# Patient Record
Sex: MALE | Race: WHITE | NOT HISPANIC OR LATINO
[De-identification: names, ages, dates, MRNs, and addresses within clinical notes are randomized per-mention and may not be internally consistent; named-entity substitution may affect disease eponyms.]

---

## 2021-08-31 ENCOUNTER — NON-APPOINTMENT (OUTPATIENT)
Age: 71
End: 2021-08-31

## 2021-10-12 ENCOUNTER — APPOINTMENT (OUTPATIENT)
Dept: FAMILY MEDICINE | Facility: CLINIC | Age: 71
End: 2021-10-12
Payer: COMMERCIAL

## 2021-10-12 VITALS
SYSTOLIC BLOOD PRESSURE: 118 MMHG | HEIGHT: 69 IN | WEIGHT: 153 LBS | TEMPERATURE: 97.7 F | OXYGEN SATURATION: 98 % | DIASTOLIC BLOOD PRESSURE: 76 MMHG | RESPIRATION RATE: 16 BRPM | BODY MASS INDEX: 22.66 KG/M2 | HEART RATE: 76 BPM

## 2021-10-12 DIAGNOSIS — Z23 ENCOUNTER FOR IMMUNIZATION: ICD-10-CM

## 2021-10-12 PROCEDURE — 90715 TDAP VACCINE 7 YRS/> IM: CPT

## 2021-10-12 PROCEDURE — 90471 IMMUNIZATION ADMIN: CPT

## 2021-10-12 PROCEDURE — 99387 INIT PM E/M NEW PAT 65+ YRS: CPT | Mod: 25

## 2021-10-12 PROCEDURE — 36415 COLL VENOUS BLD VENIPUNCTURE: CPT

## 2021-10-13 LAB
25(OH)D3 SERPL-MCNC: 33 NG/ML
ALBUMIN SERPL ELPH-MCNC: 4.6 G/DL
ALP BLD-CCNC: 91 U/L
ALT SERPL-CCNC: 24 U/L
ANION GAP SERPL CALC-SCNC: 13 MMOL/L
APPEARANCE: CLEAR
AST SERPL-CCNC: 21 U/L
BACTERIA: NEGATIVE
BASOPHILS # BLD AUTO: 0.07 K/UL
BASOPHILS NFR BLD AUTO: 1.4 %
BILIRUB SERPL-MCNC: 0.6 MG/DL
BILIRUBIN URINE: NEGATIVE
BLOOD URINE: NORMAL
BUN SERPL-MCNC: 21 MG/DL
CALCIUM SERPL-MCNC: 9.5 MG/DL
CHLORIDE SERPL-SCNC: 103 MMOL/L
CHOLEST SERPL-MCNC: 248 MG/DL
CO2 SERPL-SCNC: 25 MMOL/L
COLOR: YELLOW
CREAT SERPL-MCNC: 0.9 MG/DL
EOSINOPHIL # BLD AUTO: 0.04 K/UL
EOSINOPHIL NFR BLD AUTO: 0.8 %
ESTIMATED AVERAGE GLUCOSE: 103 MG/DL
GLUCOSE QUALITATIVE U: NEGATIVE
GLUCOSE SERPL-MCNC: 102 MG/DL
HBA1C MFR BLD HPLC: 5.2 %
HCT VFR BLD CALC: 50.8 %
HDLC SERPL-MCNC: 87 MG/DL
HGB BLD-MCNC: 16.5 G/DL
HYALINE CASTS: 0 /LPF
IMM GRANULOCYTES NFR BLD AUTO: 0.2 %
KETONES URINE: NEGATIVE
LDLC SERPL CALC-MCNC: 138 MG/DL
LEUKOCYTE ESTERASE URINE: NEGATIVE
LYMPHOCYTES # BLD AUTO: 1.22 K/UL
LYMPHOCYTES NFR BLD AUTO: 24.5 %
MAN DIFF?: NORMAL
MCHC RBC-ENTMCNC: 32.5 GM/DL
MCHC RBC-ENTMCNC: 32.7 PG
MCV RBC AUTO: 100.6 FL
MICROSCOPIC-UA: NORMAL
MONOCYTES # BLD AUTO: 0.54 K/UL
MONOCYTES NFR BLD AUTO: 10.8 %
NEUTROPHILS # BLD AUTO: 3.1 K/UL
NEUTROPHILS NFR BLD AUTO: 62.3 %
NITRITE URINE: NEGATIVE
NONHDLC SERPL-MCNC: 161 MG/DL
PH URINE: 5.5
PLATELET # BLD AUTO: 252 K/UL
POTASSIUM SERPL-SCNC: 4.4 MMOL/L
PROT SERPL-MCNC: 7.2 G/DL
PROTEIN URINE: NORMAL
PSA SERPL-MCNC: 12.4 NG/ML
RBC # BLD: 5.05 M/UL
RBC # FLD: 12.9 %
RED BLOOD CELLS URINE: 2 /HPF
SODIUM SERPL-SCNC: 141 MMOL/L
SPECIFIC GRAVITY URINE: 1.02
SQUAMOUS EPITHELIAL CELLS: 0 /HPF
TESTOST SERPL-MCNC: 747 NG/DL
TRIGL SERPL-MCNC: 113 MG/DL
TSH SERPL-ACNC: 3.39 UIU/ML
UROBILINOGEN URINE: NORMAL
WBC # FLD AUTO: 4.98 K/UL
WHITE BLOOD CELLS URINE: 0 /HPF

## 2021-12-16 ENCOUNTER — APPOINTMENT (OUTPATIENT)
Dept: FAMILY MEDICINE | Facility: CLINIC | Age: 71
End: 2021-12-16
Payer: COMMERCIAL

## 2021-12-16 ENCOUNTER — NON-APPOINTMENT (OUTPATIENT)
Age: 71
End: 2021-12-16

## 2021-12-16 VITALS
DIASTOLIC BLOOD PRESSURE: 82 MMHG | SYSTOLIC BLOOD PRESSURE: 120 MMHG | HEART RATE: 77 BPM | HEIGHT: 69 IN | TEMPERATURE: 97.5 F | OXYGEN SATURATION: 98 % | WEIGHT: 153 LBS | BODY MASS INDEX: 22.66 KG/M2

## 2021-12-16 PROCEDURE — 99214 OFFICE O/P EST MOD 30 MIN: CPT | Mod: 25

## 2021-12-16 PROCEDURE — 93000 ELECTROCARDIOGRAM COMPLETE: CPT | Mod: 59

## 2021-12-17 LAB
BASOPHILS # BLD AUTO: 0.05 K/UL
BASOPHILS NFR BLD AUTO: 0.9 %
EOSINOPHIL # BLD AUTO: 0.04 K/UL
EOSINOPHIL NFR BLD AUTO: 0.7 %
HCT VFR BLD CALC: 48.2 %
HGB BLD-MCNC: 15.6 G/DL
IMM GRANULOCYTES NFR BLD AUTO: 0.2 %
LYMPHOCYTES # BLD AUTO: 1.25 K/UL
LYMPHOCYTES NFR BLD AUTO: 22.1 %
MAN DIFF?: NORMAL
MCHC RBC-ENTMCNC: 32 PG
MCHC RBC-ENTMCNC: 32.4 GM/DL
MCV RBC AUTO: 98.8 FL
MONOCYTES # BLD AUTO: 0.65 K/UL
MONOCYTES NFR BLD AUTO: 11.5 %
NEUTROPHILS # BLD AUTO: 3.66 K/UL
NEUTROPHILS NFR BLD AUTO: 64.6 %
PLATELET # BLD AUTO: 277 K/UL
RBC # BLD: 4.88 M/UL
RBC # FLD: 12.9 %
WBC # FLD AUTO: 5.66 K/UL

## 2021-12-18 LAB
ALBUMIN SERPL ELPH-MCNC: 4.4 G/DL
ALP BLD-CCNC: 87 U/L
ALT SERPL-CCNC: 26 U/L
ANION GAP SERPL CALC-SCNC: 13 MMOL/L
AST SERPL-CCNC: 26 U/L
BILIRUB SERPL-MCNC: 0.7 MG/DL
BUN SERPL-MCNC: 23 MG/DL
CALCIUM SERPL-MCNC: 9.3 MG/DL
CHLORIDE SERPL-SCNC: 103 MMOL/L
CO2 SERPL-SCNC: 23 MMOL/L
CREAT SERPL-MCNC: 1.01 MG/DL
GLUCOSE SERPL-MCNC: 82 MG/DL
POTASSIUM SERPL-SCNC: 4.2 MMOL/L
PROT SERPL-MCNC: 6.9 G/DL
SODIUM SERPL-SCNC: 139 MMOL/L

## 2021-12-19 LAB — SARS-COV-2 N GENE NPH QL NAA+PROBE: NOT DETECTED

## 2022-01-19 ENCOUNTER — APPOINTMENT (OUTPATIENT)
Dept: UROLOGY | Facility: CLINIC | Age: 72
End: 2022-01-19
Payer: COMMERCIAL

## 2022-01-19 VITALS
SYSTOLIC BLOOD PRESSURE: 138 MMHG | HEART RATE: 82 BPM | DIASTOLIC BLOOD PRESSURE: 78 MMHG | TEMPERATURE: 98 F | RESPIRATION RATE: 16 BRPM | OXYGEN SATURATION: 99 %

## 2022-01-19 DIAGNOSIS — Z78.9 OTHER SPECIFIED HEALTH STATUS: ICD-10-CM

## 2022-01-19 PROCEDURE — 99204 OFFICE O/P NEW MOD 45 MIN: CPT

## 2022-01-21 NOTE — HISTORY OF PRESENT ILLNESS
[FreeTextEntry1] : Bong Beatty presents to the office today.  He is a 71-year-old man here today for second opinion about management of recently diagnosed prostate cancer.  His PSA was noted to be 12.58 ng/mL prior to his biopsy.  He had an MRI done shortly after his biopsy showing a prostate volume of about 99 cm³ and a PI-RADS category 4 lesion.  The patient had a 12 core needle biopsy performed showing 9 out of 12 biopsy samples positive for malignancy with Jin 3+4 pattern noted to be the most significant disease present.\par \par The patient has no known family history of prostate cancer.  He does have some mild lower urinary tract symptoms but is not currently on any medication for his prostate or bladder.

## 2022-01-21 NOTE — DISEASE MANAGEMENT
[1] : T1 [c] : c [10-20] : 10 - 20 ng/mL [Biopsy] : Patient had a biopsy on [7(3+4)] : Template Biopsy [place] Score: 7(3+4) [] : Patient had a Prostate MRI [4] : 4 [IIB] : IIB [X] : MX [MeasuredProstateVolume] : 99 [TotalCores] : 12 [TotalPositiveCores] : 9 [MaxCoreInvolvement] : 30

## 2022-02-08 ENCOUNTER — RESULT REVIEW (OUTPATIENT)
Age: 72
End: 2022-02-08

## 2022-03-11 ENCOUNTER — OUTPATIENT (OUTPATIENT)
Dept: OUTPATIENT SERVICES | Facility: HOSPITAL | Age: 72
LOS: 1 days | End: 2022-03-11
Payer: COMMERCIAL

## 2022-03-11 VITALS
OXYGEN SATURATION: 98 % | DIASTOLIC BLOOD PRESSURE: 81 MMHG | WEIGHT: 162.04 LBS | HEIGHT: 66 IN | HEART RATE: 87 BPM | SYSTOLIC BLOOD PRESSURE: 130 MMHG | TEMPERATURE: 97 F | RESPIRATION RATE: 18 BRPM

## 2022-03-11 DIAGNOSIS — Z98.890 OTHER SPECIFIED POSTPROCEDURAL STATES: Chronic | ICD-10-CM

## 2022-03-11 DIAGNOSIS — C61 MALIGNANT NEOPLASM OF PROSTATE: ICD-10-CM

## 2022-03-11 DIAGNOSIS — H26.9 UNSPECIFIED CATARACT: Chronic | ICD-10-CM

## 2022-03-11 DIAGNOSIS — R54 AGE-RELATED PHYSICAL DEBILITY: ICD-10-CM

## 2022-03-11 LAB
ANION GAP SERPL CALC-SCNC: 15 MMOL/L — HIGH (ref 7–14)
BLD GP AB SCN SERPL QL: NEGATIVE — SIGNIFICANT CHANGE UP
BUN SERPL-MCNC: 28 MG/DL — HIGH (ref 7–23)
CALCIUM SERPL-MCNC: 9.5 MG/DL — SIGNIFICANT CHANGE UP (ref 8.4–10.5)
CHLORIDE SERPL-SCNC: 104 MMOL/L — SIGNIFICANT CHANGE UP (ref 98–107)
CO2 SERPL-SCNC: 20 MMOL/L — LOW (ref 22–31)
CREAT SERPL-MCNC: 0.92 MG/DL — SIGNIFICANT CHANGE UP (ref 0.5–1.3)
EGFR: 89 ML/MIN/1.73M2 — SIGNIFICANT CHANGE UP
GLUCOSE SERPL-MCNC: 101 MG/DL — HIGH (ref 70–99)
HCT VFR BLD CALC: 46.9 % — SIGNIFICANT CHANGE UP (ref 39–50)
HGB BLD-MCNC: 15.6 G/DL — SIGNIFICANT CHANGE UP (ref 13–17)
MCHC RBC-ENTMCNC: 32 PG — SIGNIFICANT CHANGE UP (ref 27–34)
MCHC RBC-ENTMCNC: 33.3 GM/DL — SIGNIFICANT CHANGE UP (ref 32–36)
MCV RBC AUTO: 96.3 FL — SIGNIFICANT CHANGE UP (ref 80–100)
NRBC # BLD: 0 /100 WBCS — SIGNIFICANT CHANGE UP
NRBC # FLD: 0 K/UL — SIGNIFICANT CHANGE UP
PLATELET # BLD AUTO: 253 K/UL — SIGNIFICANT CHANGE UP (ref 150–400)
POTASSIUM SERPL-MCNC: 3.9 MMOL/L — SIGNIFICANT CHANGE UP (ref 3.5–5.3)
POTASSIUM SERPL-SCNC: 3.9 MMOL/L — SIGNIFICANT CHANGE UP (ref 3.5–5.3)
RBC # BLD: 4.87 M/UL — SIGNIFICANT CHANGE UP (ref 4.2–5.8)
RBC # FLD: 13.1 % — SIGNIFICANT CHANGE UP (ref 10.3–14.5)
RH IG SCN BLD-IMP: POSITIVE — SIGNIFICANT CHANGE UP
SODIUM SERPL-SCNC: 139 MMOL/L — SIGNIFICANT CHANGE UP (ref 135–145)
WBC # BLD: 6.74 K/UL — SIGNIFICANT CHANGE UP (ref 3.8–10.5)
WBC # FLD AUTO: 6.74 K/UL — SIGNIFICANT CHANGE UP (ref 3.8–10.5)

## 2022-03-11 PROCEDURE — 93010 ELECTROCARDIOGRAM REPORT: CPT

## 2022-03-11 RX ORDER — SODIUM CHLORIDE 9 MG/ML
1000 INJECTION, SOLUTION INTRAVENOUS
Refills: 0 | Status: DISCONTINUED | OUTPATIENT
Start: 2022-03-21 | End: 2022-03-21

## 2022-03-11 NOTE — H&P PST ADULT - PROBLEM SELECTOR PLAN 1
This is a 72 y/o male who is scheduled for robotic radical prostatectomy, pelvic lymph node dissection on 3-21-22  * Instructed to speak with surgeon regarding covid testing  * Given preop and cleanser instructions with good teach back and patient verbalized understanding

## 2022-03-11 NOTE — H&P PST ADULT - HISTORY OF PRESENT ILLNESS
This is a 72 y/o male who presents with elevated PSA. Subsequent sonogram CT scan and biopsy confirmed prostate cancer. Scheduled for robotic radical prostatectomy, pelvic lymph node dissection

## 2022-03-11 NOTE — H&P PST ADULT - PROBLEM SELECTOR PLAN 2
As discussed with anesthesia, Dr. Mcgowan, await prearranged medical evaluation with pcp due to advanced age and major surgery

## 2022-03-11 NOTE — H&P PST ADULT - NSICDXPASTSURGICALHX_GEN_ALL_CORE_FT
PAST SURGICAL HISTORY:  Bilateral cataracts     H/O inguinal hernia repair left side 11 years ago

## 2022-03-12 LAB
CULTURE RESULTS: SIGNIFICANT CHANGE UP
SPECIMEN SOURCE: SIGNIFICANT CHANGE UP

## 2022-03-15 PROBLEM — C61 MALIGNANT NEOPLASM OF PROSTATE: Chronic | Status: ACTIVE | Noted: 2022-03-11

## 2022-03-18 ENCOUNTER — APPOINTMENT (OUTPATIENT)
Dept: FAMILY MEDICINE | Facility: CLINIC | Age: 72
End: 2022-03-18
Payer: COMMERCIAL

## 2022-03-18 VITALS
HEIGHT: 69 IN | WEIGHT: 153 LBS | TEMPERATURE: 97.8 F | SYSTOLIC BLOOD PRESSURE: 129 MMHG | DIASTOLIC BLOOD PRESSURE: 70 MMHG | BODY MASS INDEX: 22.66 KG/M2 | HEART RATE: 80 BPM | OXYGEN SATURATION: 98 %

## 2022-03-18 DIAGNOSIS — Z01.818 ENCOUNTER FOR OTHER PREPROCEDURAL EXAMINATION: ICD-10-CM

## 2022-03-18 PROCEDURE — 99214 OFFICE O/P EST MOD 30 MIN: CPT

## 2022-03-18 NOTE — HISTORY OF PRESENT ILLNESS
[No Pertinent Cardiac History] : no history of aortic stenosis, atrial fibrillation, coronary artery disease, recent myocardial infarction, or implantable device/pacemaker [No Pertinent Pulmonary History] : no history of asthma, COPD, sleep apnea, or smoking [No Adverse Anesthesia Reaction] : no adverse anesthesia reaction in self or family member [(Patient denies any chest pain, claudication, dyspnea on exertion, orthopnea, palpitations or syncope)] : Patient denies any chest pain, claudication, dyspnea on exertion, orthopnea, palpitations or syncope [Chronic Anticoagulation] : no chronic anticoagulation [Chronic Kidney Disease] : no chronic kidney disease [Diabetes] : no diabetes [FreeTextEntry1] : Prostatectomy [FreeTextEntry2] : 3/21/22 [FreeTextEntry3] : Dr. Lucio [FreeTextEntry4] : here for preop clearance [FreeTextEntry7] : hx of abn ekg and stress test yrs ago, nml pr pt (does not have report)

## 2022-03-18 NOTE — ASU PATIENT PROFILE, ADULT - FALL HARM RISK - UNIVERSAL INTERVENTIONS
Bed in lowest position, wheels locked, appropriate side rails in place/Call bell, personal items and telephone in reach/Instruct patient to call for assistance before getting out of bed or chair/Non-slip footwear when patient is out of bed/[place] to call system/Physically safe environment - no spills, clutter or unnecessary equipment/Purposeful Proactive Rounding/Room/bathroom lighting operational, light cord in reach

## 2022-03-18 NOTE — PHYSICAL EXAM
[No Acute Distress] : no acute distress [Well Nourished] : well nourished [Normal Sclera/Conjunctiva] : normal sclera/conjunctiva [EOMI] : extraocular movements intact [Normal Outer Ear/Nose] : the outer ears and nose were normal in appearance [No JVD] : no jugular venous distention [No Lymphadenopathy] : no lymphadenopathy [Supple] : supple [Normal] : normal rate, regular rhythm, normal S1 and S2 and no murmur heard [Coordination Grossly Intact] : coordination grossly intact [Normal Gait] : normal gait [Normal Affect] : the affect was normal [Normal Insight/Judgement] : insight and judgment were intact

## 2022-03-20 ENCOUNTER — TRANSCRIPTION ENCOUNTER (OUTPATIENT)
Age: 72
End: 2022-03-20

## 2022-03-21 ENCOUNTER — APPOINTMENT (OUTPATIENT)
Dept: UROLOGY | Facility: HOSPITAL | Age: 72
End: 2022-03-21

## 2022-03-21 ENCOUNTER — RESULT REVIEW (OUTPATIENT)
Age: 72
End: 2022-03-21

## 2022-03-21 ENCOUNTER — NON-APPOINTMENT (OUTPATIENT)
Age: 72
End: 2022-03-21

## 2022-03-21 ENCOUNTER — INPATIENT (INPATIENT)
Facility: HOSPITAL | Age: 72
LOS: 0 days | Discharge: HOME | End: 2022-03-22
Attending: UROLOGY | Admitting: UROLOGY
Payer: SELF-PAY

## 2022-03-21 VITALS
SYSTOLIC BLOOD PRESSURE: 145 MMHG | TEMPERATURE: 98 F | OXYGEN SATURATION: 98 % | HEART RATE: 72 BPM | WEIGHT: 162.04 LBS | RESPIRATION RATE: 16 BRPM | HEIGHT: 66 IN | DIASTOLIC BLOOD PRESSURE: 76 MMHG

## 2022-03-21 DIAGNOSIS — H26.9 UNSPECIFIED CATARACT: Chronic | ICD-10-CM

## 2022-03-21 DIAGNOSIS — Z98.890 OTHER SPECIFIED POSTPROCEDURAL STATES: Chronic | ICD-10-CM

## 2022-03-21 DIAGNOSIS — C61 MALIGNANT NEOPLASM OF PROSTATE: ICD-10-CM

## 2022-03-21 PROCEDURE — 38571 LAPAROSCOPY LYMPHADENECTOMY: CPT

## 2022-03-21 PROCEDURE — 88307 TISSUE EXAM BY PATHOLOGIST: CPT | Mod: 26

## 2022-03-21 PROCEDURE — 55866 LAPS SURG PRST8ECT RPBIC RAD: CPT

## 2022-03-21 PROCEDURE — 88309 TISSUE EXAM BY PATHOLOGIST: CPT | Mod: 26

## 2022-03-21 DEVICE — LIGATING CLIPS WECK HEMOLOK POLYMER LARGE (PURPLE) 6: Type: IMPLANTABLE DEVICE | Status: FUNCTIONAL

## 2022-03-21 RX ORDER — FENTANYL CITRATE 50 UG/ML
25 INJECTION INTRAVENOUS
Refills: 0 | Status: DISCONTINUED | OUTPATIENT
Start: 2022-03-21 | End: 2022-03-21

## 2022-03-21 RX ORDER — KETOROLAC TROMETHAMINE 30 MG/ML
15 SYRINGE (ML) INJECTION EVERY 6 HOURS
Refills: 0 | Status: DISCONTINUED | OUTPATIENT
Start: 2022-03-21 | End: 2022-03-22

## 2022-03-21 RX ORDER — HEPARIN SODIUM 5000 [USP'U]/ML
5000 INJECTION INTRAVENOUS; SUBCUTANEOUS EVERY 8 HOURS
Refills: 0 | Status: DISCONTINUED | OUTPATIENT
Start: 2022-03-21 | End: 2022-03-22

## 2022-03-21 RX ORDER — SODIUM CHLORIDE 9 MG/ML
1000 INJECTION, SOLUTION INTRAVENOUS
Refills: 0 | Status: DISCONTINUED | OUTPATIENT
Start: 2022-03-21 | End: 2022-03-21

## 2022-03-21 RX ORDER — HYDROMORPHONE HYDROCHLORIDE 2 MG/ML
0.25 INJECTION INTRAMUSCULAR; INTRAVENOUS; SUBCUTANEOUS
Refills: 0 | Status: DISCONTINUED | OUTPATIENT
Start: 2022-03-21 | End: 2022-03-21

## 2022-03-21 RX ORDER — SODIUM CHLORIDE 9 MG/ML
1000 INJECTION, SOLUTION INTRAVENOUS
Refills: 0 | Status: DISCONTINUED | OUTPATIENT
Start: 2022-03-21 | End: 2022-03-22

## 2022-03-21 RX ORDER — METOCLOPRAMIDE HCL 10 MG
10 TABLET ORAL EVERY 6 HOURS
Refills: 0 | Status: DISCONTINUED | OUTPATIENT
Start: 2022-03-21 | End: 2022-03-22

## 2022-03-21 RX ORDER — ACETAMINOPHEN 500 MG
975 TABLET ORAL EVERY 6 HOURS
Refills: 0 | Status: DISCONTINUED | OUTPATIENT
Start: 2022-03-21 | End: 2022-03-22

## 2022-03-21 RX ORDER — SENNA PLUS 8.6 MG/1
2 TABLET ORAL AT BEDTIME
Refills: 0 | Status: DISCONTINUED | OUTPATIENT
Start: 2022-03-21 | End: 2022-03-22

## 2022-03-21 RX ORDER — LIDOCAINE 4 G/100G
1 CREAM TOPICAL
Refills: 0 | Status: DISCONTINUED | OUTPATIENT
Start: 2022-03-21 | End: 2022-03-22

## 2022-03-21 RX ORDER — ONDANSETRON 8 MG/1
4 TABLET, FILM COATED ORAL ONCE
Refills: 0 | Status: DISCONTINUED | OUTPATIENT
Start: 2022-03-21 | End: 2022-03-21

## 2022-03-21 RX ADMIN — SENNA PLUS 2 TABLET(S): 8.6 TABLET ORAL at 22:38

## 2022-03-21 RX ADMIN — HEPARIN SODIUM 5000 UNIT(S): 5000 INJECTION INTRAVENOUS; SUBCUTANEOUS at 22:37

## 2022-03-21 NOTE — PATIENT PROFILE ADULT - FALL HARM RISK - HARM RISK INTERVENTIONS

## 2022-03-22 ENCOUNTER — TRANSCRIPTION ENCOUNTER (OUTPATIENT)
Age: 72
End: 2022-03-22

## 2022-03-22 VITALS
RESPIRATION RATE: 17 BRPM | OXYGEN SATURATION: 97 % | SYSTOLIC BLOOD PRESSURE: 122 MMHG | HEART RATE: 78 BPM | DIASTOLIC BLOOD PRESSURE: 57 MMHG | TEMPERATURE: 99 F

## 2022-03-22 LAB
ANION GAP SERPL CALC-SCNC: 12 MMOL/L — SIGNIFICANT CHANGE UP (ref 7–14)
BASOPHILS # BLD AUTO: 0.03 K/UL — SIGNIFICANT CHANGE UP (ref 0–0.2)
BASOPHILS NFR BLD AUTO: 0.4 % — SIGNIFICANT CHANGE UP (ref 0–2)
BUN SERPL-MCNC: 24 MG/DL — HIGH (ref 7–23)
CALCIUM SERPL-MCNC: 8.1 MG/DL — LOW (ref 8.4–10.5)
CHLORIDE SERPL-SCNC: 104 MMOL/L — SIGNIFICANT CHANGE UP (ref 98–107)
CO2 SERPL-SCNC: 22 MMOL/L — SIGNIFICANT CHANGE UP (ref 22–31)
CREAT FLD-MCNC: 1.2 MG/DL — SIGNIFICANT CHANGE UP
CREAT SERPL-MCNC: 1.11 MG/DL — SIGNIFICANT CHANGE UP (ref 0.5–1.3)
EGFR: 71 ML/MIN/1.73M2 — SIGNIFICANT CHANGE UP
EOSINOPHIL # BLD AUTO: 0.14 K/UL — SIGNIFICANT CHANGE UP (ref 0–0.5)
EOSINOPHIL NFR BLD AUTO: 1.9 % — SIGNIFICANT CHANGE UP (ref 0–6)
GLUCOSE SERPL-MCNC: 101 MG/DL — HIGH (ref 70–99)
HCT VFR BLD CALC: 41.3 % — SIGNIFICANT CHANGE UP (ref 39–50)
HGB BLD-MCNC: 13.7 G/DL — SIGNIFICANT CHANGE UP (ref 13–17)
IANC: 5.11 K/UL — SIGNIFICANT CHANGE UP (ref 1.5–8.5)
IMM GRANULOCYTES NFR BLD AUTO: 0.1 % — SIGNIFICANT CHANGE UP (ref 0–1.5)
LYMPHOCYTES # BLD AUTO: 1 K/UL — SIGNIFICANT CHANGE UP (ref 1–3.3)
LYMPHOCYTES # BLD AUTO: 13.6 % — SIGNIFICANT CHANGE UP (ref 13–44)
MCHC RBC-ENTMCNC: 32.2 PG — SIGNIFICANT CHANGE UP (ref 27–34)
MCHC RBC-ENTMCNC: 33.2 GM/DL — SIGNIFICANT CHANGE UP (ref 32–36)
MCV RBC AUTO: 97.2 FL — SIGNIFICANT CHANGE UP (ref 80–100)
MONOCYTES # BLD AUTO: 1.08 K/UL — HIGH (ref 0–0.9)
MONOCYTES NFR BLD AUTO: 14.7 % — HIGH (ref 2–14)
NEUTROPHILS # BLD AUTO: 5.11 K/UL — SIGNIFICANT CHANGE UP (ref 1.8–7.4)
NEUTROPHILS NFR BLD AUTO: 69.3 % — SIGNIFICANT CHANGE UP (ref 43–77)
NRBC # BLD: 0 /100 WBCS — SIGNIFICANT CHANGE UP
NRBC # FLD: 0 K/UL — SIGNIFICANT CHANGE UP
PLATELET # BLD AUTO: 213 K/UL — SIGNIFICANT CHANGE UP (ref 150–400)
POTASSIUM SERPL-MCNC: 4 MMOL/L — SIGNIFICANT CHANGE UP (ref 3.5–5.3)
POTASSIUM SERPL-SCNC: 4 MMOL/L — SIGNIFICANT CHANGE UP (ref 3.5–5.3)
RBC # BLD: 4.25 M/UL — SIGNIFICANT CHANGE UP (ref 4.2–5.8)
RBC # FLD: 12.9 % — SIGNIFICANT CHANGE UP (ref 10.3–14.5)
SODIUM SERPL-SCNC: 138 MMOL/L — SIGNIFICANT CHANGE UP (ref 135–145)
WBC # BLD: 7.37 K/UL — SIGNIFICANT CHANGE UP (ref 3.8–10.5)
WBC # FLD AUTO: 7.37 K/UL — SIGNIFICANT CHANGE UP (ref 3.8–10.5)

## 2022-03-22 RX ORDER — IBUPROFEN 200 MG
3 TABLET ORAL
Qty: 0 | Refills: 0 | DISCHARGE

## 2022-03-22 RX ORDER — ACETAMINOPHEN 500 MG
3 TABLET ORAL
Qty: 0 | Refills: 0 | DISCHARGE
Start: 2022-03-22

## 2022-03-22 RX ORDER — LIDOCAINE 4 G/100G
1 CREAM TOPICAL
Qty: 0 | Refills: 0 | DISCHARGE
Start: 2022-03-22

## 2022-03-22 RX ORDER — SODIUM CHLORIDE 9 MG/ML
1000 INJECTION, SOLUTION INTRAVENOUS
Refills: 0 | Status: DISCONTINUED | OUTPATIENT
Start: 2022-03-22 | End: 2022-03-22

## 2022-03-22 RX ADMIN — HEPARIN SODIUM 5000 UNIT(S): 5000 INJECTION INTRAVENOUS; SUBCUTANEOUS at 05:18

## 2022-03-22 RX ADMIN — SODIUM CHLORIDE 125 MILLILITER(S): 9 INJECTION, SOLUTION INTRAVENOUS at 05:19

## 2022-03-22 NOTE — DISCHARGE NOTE PROVIDER - NSDCMRMEDTOKEN_GEN_ALL_CORE_FT
acetaminophen 325 mg oral tablet: 3 tab(s) orally every 6 hours, As needed, Mild Pain (1 - 3), Moderate Pain (4 - 6), Severe Pain (7 - 10)  ibuprofen 200 mg oral tablet: 3 tab(s) orally every 6 hours  Alternate with tylenol  So that first take tylenol; 3 hrs later take ibuprofen; 3 hrs later take tylenol, etc  lidocaine 5% topical ointment: 1 application topically every 3 hours, As needed, haines irritation

## 2022-03-22 NOTE — DISCHARGE NOTE PROVIDER - CARE PROVIDER_API CALL
Paul Hutton)  Urology  [address] [place] [address], [place] [address]  Phone: [phone]  Fax: [phone]  Follow Up Time:

## 2022-03-22 NOTE — DISCHARGE NOTE NURSING/CASE MANAGEMENT/SOCIAL WORK - NSDCPNINST_GEN_ALL_CORE
Make a follow up appointment with Dr. Hutton. Call MD if you develop a fever, or if there is redness, swelling, drainage or pain not relieved by pain medication. No heavy lifting, bending, or straining to move your bowels. Take over the counter stool softeners as needed to prevent constipation which may be caused by pain medication. Gates care as instructed. Drink plenty of liquids.

## 2022-03-22 NOTE — DISCHARGE NOTE PROVIDER - HOSPITAL COURSE
Pt had RALP/LND yesterday; did well; FEDERICO removed; ambulated; pain is controlled; labs stable; home with haines; f/u next week.

## 2022-03-22 NOTE — DISCHARGE NOTE NURSING/CASE MANAGEMENT/SOCIAL WORK - NSDPDISTO_GEN_ALL_CORE
Pt. is afebrile and offers no complaints. In no acute distress. Abdominal scope sites: clean, dry and intact. Pt is ambulating, tolerating diet well, and haines draining adequate amounts of urine./Home

## 2022-03-22 NOTE — PROGRESS NOTE ADULT - SUBJECTIVE AND OBJECTIVE BOX
Subjective  No acute events overnight. Denies pain. Tolerating liquids without nausea. No flatus. Not yet OOB.    Objective    Vital signs  T(F): , Max: 98.8 (03-21-22 @ 17:55)  HR: 76 (03-22-22 @ 05:29)  BP: 125/66 (03-22-22 @ 05:29)  SpO2: 97% (03-22-22 @ 05:29)  Wt(kg): --    Output     OUT:    Bulb (mL): 255 mL    Indwelling Catheter - Urethral (mL): 780 mL    Voided (mL): 1225 mL  Total OUT: 2260 mL    Total NET: -2260 mL          Gen: NAD  Abd: soft, nontender to palpation, mildly distended, incisions c/d/i, FEDERICO SS  : Gates secured in place, draining clear yellow urine
 Post op Check    Pt seen and examined without complaints. Pain is controlled. Denies SOB/CP/N/V.     Vital Signs Last 24 Hrs  T(C): 37.1 (21 Mar 2022 17:55), Max: 37.1 (21 Mar 2022 17:55)  T(F): 98.8 (21 Mar 2022 17:55), Max: 98.8 (21 Mar 2022 17:55)  HR: 76 (21 Mar 2022 20:00) (72 - 94)  BP: 117/58 (21 Mar 2022 20:00) (108/57 - 145/76)  BP(mean): 76 (21 Mar 2022 20:00) (73 - 92)  RR: 21 (21 Mar 2022 20:00) (13 - 21)  SpO2: 94% (21 Mar 2022 20:00) (92% - 98%)    I&O's Summary    21 Mar 2022 07:01  -  21 Mar 2022 20:36  --------------------------------------------------------  IN: 1575 mL / OUT: 2025 mL / NET: -450 mL        Physical Exam  Gen: NAD  Pulm: No respiratory distress, no subcostal retractions  CV: RRR  Abd: Soft, non-distended, appropriately tender, + small dressings mildly saturated, + right sided FEDERICO serosanguinous  : + haines, light pink urine

## 2022-03-22 NOTE — DISCHARGE NOTE PROVIDER - NSDCCPCAREPLAN_GEN_ALL_CORE_FT
PRINCIPAL DISCHARGE DIAGNOSIS  Diagnosis: Prostate cancer  Assessment and Plan of Treatment: Drink plenty of fluids.  No heavy lifting (greater than 10 pounds) or straining for 4 to 6 weeks.  You may shower, just pat white strips dry, they will fall off in a few weeks. Change dressing at drain site daily or as needed until dry. Gates care as instructed   's office will call  to schedule a follow up appointment.  Call the office if you have fever greater than 101, pain not relieved with pain medication, nausea/vomiting..

## 2022-03-22 NOTE — DISCHARGE NOTE NURSING/CASE MANAGEMENT/SOCIAL WORK - PATIENT PORTAL LINK FT
You can access the FollowMyHealth Patient Portal offered by Health system by registering at the following website: [email or web address]. By joining [facility]’s FollowMyHealth portal, you will also be able to view your health information using other applications (apps) compatible with our system.

## 2022-03-24 ENCOUNTER — NON-APPOINTMENT (OUTPATIENT)
Age: 72
End: 2022-03-24

## 2022-03-29 ENCOUNTER — APPOINTMENT (OUTPATIENT)
Dept: UROLOGY | Facility: CLINIC | Age: 72
End: 2022-03-29
Payer: COMMERCIAL

## 2022-03-29 VITALS — HEART RATE: 76 BPM | SYSTOLIC BLOOD PRESSURE: 136 MMHG | DIASTOLIC BLOOD PRESSURE: 82 MMHG

## 2022-03-29 PROCEDURE — 99024 POSTOP FOLLOW-UP VISIT: CPT

## 2022-03-31 LAB — SURGICAL PATHOLOGY STUDY: SIGNIFICANT CHANGE UP

## 2022-04-29 ENCOUNTER — APPOINTMENT (OUTPATIENT)
Dept: UROLOGY | Facility: CLINIC | Age: 72
End: 2022-04-29
Payer: COMMERCIAL

## 2022-04-29 DIAGNOSIS — Z87.438 PERSONAL HISTORY OF OTHER DISEASES OF MALE GENITAL ORGANS: ICD-10-CM

## 2022-04-29 PROCEDURE — 99024 POSTOP FOLLOW-UP VISIT: CPT

## 2022-04-30 LAB — PSA SERPL-MCNC: 0.04 NG/ML

## 2022-09-06 ENCOUNTER — APPOINTMENT (OUTPATIENT)
Dept: UROLOGY | Facility: CLINIC | Age: 72
End: 2022-09-06

## 2022-09-07 NOTE — H&P PST ADULT - VENOUS THROMBOEMBOLISM FOR WOMEN ONLY
[FreeTextEntry1] : Patient is a 78-year-old male with a large hiatal hernia not desiring repair with occasional GERD.  He uses pantoprazole only occasionally.  He also has had a 2-month history of "lower quadrant discomfort.  He has had a previous colonoscopy over the last 2 years.  No weight loss anorexia fever or chills or rectal bleeding.\par \par Returned recently from [place] and has regular bowel movements with a foul smell.  He is using a colonic purgative that he purchased in [place].  There is no fever or chills.  He is no longer using any pantoprazole.  He saw his pulmonologist Eron Johnson yesterday\par \par \par 09/7/22- returns in followup today; Used OTC prevacid x 14 days and felt better, with improved GERD, eructation.  Saw Dr. Montoya and pending  stress test/MPI. Complains of "increased gas." Hx of diverticulosis.  \par \par Imp:\par 1. Large hiatal hernia, with gerd\par 2. IBS/Diveriticulosis\par 3. Atypical CP likely due to HH\par \par Plan:\par 1. Encouraged pt to resume ppi\par 2. Discussed if sxs persist, repeat EGD to accurately assess HH size\par 3. Agree with Stress test\par 4. High fiber diet.
(0) indicator not present

## 2022-09-08 ENCOUNTER — APPOINTMENT (OUTPATIENT)
Dept: UROLOGY | Facility: CLINIC | Age: 72
End: 2022-09-08

## 2022-09-08 VITALS — HEART RATE: 74 BPM | RESPIRATION RATE: 18 BRPM | SYSTOLIC BLOOD PRESSURE: 143 MMHG | DIASTOLIC BLOOD PRESSURE: 78 MMHG

## 2022-09-08 DIAGNOSIS — N52.31 ERECTILE DYSFUNCTION FOLLOWING RADICAL PROSTATECTOMY: ICD-10-CM

## 2022-09-08 DIAGNOSIS — Z87.448 PERSONAL HISTORY OF OTHER DISEASES OF URINARY SYSTEM: ICD-10-CM

## 2022-09-08 PROCEDURE — 99214 OFFICE O/P EST MOD 30 MIN: CPT

## 2022-09-11 PROBLEM — N52.31 ERECTILE DYSFUNCTION AFTER RADICAL PROSTATECTOMY: Status: ACTIVE | Noted: 2022-09-11

## 2022-09-11 LAB — PSA SERPL-MCNC: 0.21 NG/ML

## 2023-01-17 ENCOUNTER — APPOINTMENT (OUTPATIENT)
Dept: UROLOGY | Facility: CLINIC | Age: 73
End: 2023-01-17
Payer: COMMERCIAL

## 2023-01-17 PROCEDURE — 99213 OFFICE O/P EST LOW 20 MIN: CPT

## 2023-01-25 ENCOUNTER — APPOINTMENT (OUTPATIENT)
Dept: UROLOGY | Facility: CLINIC | Age: 73
End: 2023-01-25
Payer: COMMERCIAL

## 2023-01-25 VITALS
HEIGHT: 69 IN | HEART RATE: 82 BPM | DIASTOLIC BLOOD PRESSURE: 79 MMHG | WEIGHT: 155 LBS | RESPIRATION RATE: 17 BRPM | SYSTOLIC BLOOD PRESSURE: 150 MMHG | TEMPERATURE: 97.8 F | BODY MASS INDEX: 22.96 KG/M2

## 2023-01-25 LAB — PSA SERPL-MCNC: 0.36 NG/ML

## 2023-01-25 PROCEDURE — 99213 OFFICE O/P EST LOW 20 MIN: CPT

## 2023-01-25 NOTE — PHYSICAL EXAM
[Normal] : oriented to person, place and time, the affect was normal, the mood was normal and not anxious [de-identified] : See note

## 2023-01-25 NOTE — HISTORY OF PRESENT ILLNESS
[FreeTextEntry1] : Bong Beatty is a 73 y/o male with pmhx significant for prostate cancer; s/p radical prostatectomy (March 2022).\par Patient reports no changes since his last visit- he is doing well. Denies urinary urgency, urinary frequency, urinary incontinence, hematuria\par Patient reports nocturia 2-3x related to fluid intake as well as minimal leakage when in a "deep sleep." He does not use pads or diapers. \par \par Patient reports significant improvement with sexual function since his last visit. Unable to maintain erection but can produce an erection with stimulation.

## 2023-01-25 NOTE — DISEASE MANAGEMENT
[1] : T1 [c] : c [10-20] : 10 - 20 ng/mL [Biopsy] : Patient had a biopsy on [Biopsy results sent to PCP/Referring Physician] : Biopsy results sent to PCP/Referring Physician [] : Patient had a Prostate MRI [4] : 4 [IIB] : IIB [Radical Prostatectomy] : Radical Prostatectomy [Patient had a radical prostatectomy] : Patient had a radical prostatectomy  [7(3+4)] : Jin Score 7(3+4) [Positive] : Positive margins [2] : T2 [0] : N0 [X] : MX [BiopsyDate] : 12/21 [MeasuredProstateVolume] : 99 [TotalCores] : 12 [TotalPositiveCores] : 9 [MaxCoreInvolvement] : 30 [RadicalProstatectomyDate] : 03/22 [TotalNumberofnodesresected] : 7 [PositiveNodes] : 0

## 2023-01-25 NOTE — ASSESSMENT
[FreeTextEntry1] : The PSA level will be repeated today as part of his routine follow-up for the prostate cancer.  We will consider the potential referral to a radiation oncologist should we see much further increase.  He communicates his understanding.\par \par Is doing very well overall otherwise with his functional recovery.  We reviewed options for management of the postoperative ED.  For now he would prefer to continue to monitor things that he has seen notable improvements.

## 2023-02-07 ENCOUNTER — OUTPATIENT (OUTPATIENT)
Dept: OUTPATIENT SERVICES | Facility: HOSPITAL | Age: 73
LOS: 1 days | Discharge: HOME | End: 2023-02-07
Payer: COMMERCIAL

## 2023-02-07 DIAGNOSIS — H26.9 UNSPECIFIED CATARACT: Chronic | ICD-10-CM

## 2023-02-07 DIAGNOSIS — Z98.890 OTHER SPECIFIED POSTPROCEDURAL STATES: Chronic | ICD-10-CM

## 2023-02-10 ENCOUNTER — APPOINTMENT (OUTPATIENT)
Dept: RADIATION ONCOLOGY | Facility: CLINIC | Age: 73
End: 2023-02-10
Payer: COMMERCIAL

## 2023-02-10 PROCEDURE — 99205 OFFICE O/P NEW HI 60 MIN: CPT | Mod: 25

## 2023-02-10 NOTE — PHYSICAL EXAM
[Normal] : normal heart rate and rhythm, normal S1 and S2, and no murmurs present [Oriented To Time, Place, And Person] : oriented to person, place, and time [General Appearance - Well Developed] : well developed [General Appearance - Well Nourished] : well nourished [General Appearance - Alert] : alert [General Appearance - In No Acute Distress] : in no acute distress [Sclera] : the sclera and conjunctiva were normal [Extraocular Movements] : extraocular movements were intact [Outer Ear] : the ears and nose were normal in appearance [Hearing Threshold Finger Rub Not [place]] : hearing was normal [] : no respiratory distress [Respiration, Rhythm And Depth] : normal respiratory rhythm and effort [Exaggerated Use Of Accessory Muscles For Inspiration] : no accessory muscle use [Arterial Pulses Normal] : the arterial pulses were normal [Edema] : no peripheral edema present [Abdomen Soft] : soft [Nondistended] : nondistended [Nail Clubbing] : no clubbing  or cyanosis of the fingernails [Range of Motion to Joints] : range of motion to joints [Motor Tone] : muscle strength and tone were normal [Skin Color & Pigmentation] : normal skin color and pigmentation [No Focal Deficits] : no focal deficits [Affect] : the affect was normal

## 2023-02-10 NOTE — HISTORY OF PRESENT ILLNESS
[FreeTextEntry1] : 72 year old male with recurrent adenocarcinoma of the prostate s/p prostatectomy 3/21/22 presents for consideration of salvage RT. Most recent PSA  0.36 1/17/23\par \par Brief Oncologic history: \par \par Presurgical PSA was 12.4, with up to G7(3+4) disease on biopsy from 2/8/22. Presurgical MRI from 1/19/22 had shown a 99cc gland with a IL-4 lesion in the right to left posteromedial apex (1.1cm). \par \par He underwent prostatectomy 3/21/22 with Dr. Hutton. Pathology showed a 93 g gland with [id number] G7(3+4) disease, 0/7 nodes involved; positive margin present right apical, left apical, and left posterior. \par \par PSA never undetectable, 0.04 on 4/29/22 and rising to 0.36 most recently 1/17/23.\par \par iPSA 12.4 10/12/21\par -> prostatectomy 3/21/22\par 0.04 4/29/22\par 0.21 9/8/22\par 0.36 1/17/23\par \par Social Hx: no smoking or drinking\par Occupation: Work at [employer] \par \par Presents 2/10//2023 for evaluation:\par At baseline he feels well, reports no GI/ symptoms. Denies any pain. Has full continence, not actively doing Kegels at this time, and does not use medications for erections. Very active at work, strenuous activities with heavy lifting.

## 2023-02-10 NOTE — VITALS
[Maximal Pain Intensity: 0/10] : 0/10 [Date: ____________] : Patient's last distress assessment performed on [unfilled]. [3 - Distress Level] : Distress Level: 3 [100: Normal, no complaints, no evidence of disease.] : 100: Normal, no complaints, no evidence of disease. [ECOG Performance Status: 0 - Fully active, able to carry on all pre-disease performance without restriction] : Performance Status: 0 - Fully active, able to carry on all pre-disease performance without restriction

## 2023-02-12 LAB
ALBUMIN SERPL ELPH-MCNC: 4.2 G/DL
ALP BLD-CCNC: 86 U/L
ALT SERPL-CCNC: 30 U/L
ANION GAP SERPL CALC-SCNC: 15 MMOL/L
AST SERPL-CCNC: 30 U/L
BASOPHILS # BLD AUTO: 0.04 K/UL
BASOPHILS NFR BLD AUTO: 0.9 %
BILIRUB SERPL-MCNC: 0.4 MG/DL
BUN SERPL-MCNC: 24 MG/DL
CALCIUM SERPL-MCNC: 9.6 MG/DL
CHLORIDE SERPL-SCNC: 105 MMOL/L
CO2 SERPL-SCNC: 23 MMOL/L
CREAT SERPL-MCNC: 1.05 MG/DL
EGFR: 75 ML/MIN/1.73M2
EOSINOPHIL # BLD AUTO: 0.06 K/UL
EOSINOPHIL NFR BLD AUTO: 1.3 %
GLUCOSE SERPL-MCNC: 90 MG/DL
HCT VFR BLD CALC: 46.2 %
HGB BLD-MCNC: 14.8 G/DL
IMM GRANULOCYTES NFR BLD AUTO: 0.2 %
LYMPHOCYTES # BLD AUTO: 1.03 K/UL
LYMPHOCYTES NFR BLD AUTO: 22.8 %
MAN DIFF?: NORMAL
MCHC RBC-ENTMCNC: 31.3 PG
MCHC RBC-ENTMCNC: 32 GM/DL
MCV RBC AUTO: 97.7 FL
MONOCYTES # BLD AUTO: 0.59 K/UL
MONOCYTES NFR BLD AUTO: 13.1 %
NEUTROPHILS # BLD AUTO: 2.79 K/UL
NEUTROPHILS NFR BLD AUTO: 61.7 %
PLATELET # BLD AUTO: 292 K/UL
POTASSIUM SERPL-SCNC: 5 MMOL/L
PROT SERPL-MCNC: 7.3 G/DL
PSA SERPL-MCNC: 0.46 NG/ML
RBC # BLD: 4.73 M/UL
RBC # FLD: 13 %
SODIUM SERPL-SCNC: 143 MMOL/L
TESTOST SERPL-MCNC: 510 NG/DL
WBC # FLD AUTO: 4.52 K/UL

## 2023-02-28 ENCOUNTER — APPOINTMENT (OUTPATIENT)
Dept: NUCLEAR MEDICINE | Facility: IMAGING CENTER | Age: 73
End: 2023-02-28
Payer: COMMERCIAL

## 2023-02-28 ENCOUNTER — OUTPATIENT (OUTPATIENT)
Dept: OUTPATIENT SERVICES | Facility: HOSPITAL | Age: 73
LOS: 1 days | End: 2023-02-28
Payer: COMMERCIAL

## 2023-02-28 DIAGNOSIS — Z98.890 OTHER SPECIFIED POSTPROCEDURAL STATES: Chronic | ICD-10-CM

## 2023-02-28 DIAGNOSIS — H26.9 UNSPECIFIED CATARACT: Chronic | ICD-10-CM

## 2023-02-28 DIAGNOSIS — C61 MALIGNANT NEOPLASM OF PROSTATE: ICD-10-CM

## 2023-02-28 PROCEDURE — A9595: CPT

## 2023-02-28 PROCEDURE — 78816 PET IMAGE W/CT FULL BODY: CPT | Mod: 26

## 2023-02-28 PROCEDURE — 78816 PET IMAGE W/CT FULL BODY: CPT

## 2023-03-01 PROCEDURE — 77263 THER RADIOLOGY TX PLNG CPLX: CPT

## 2023-03-05 ENCOUNTER — TRANSCRIPTION ENCOUNTER (OUTPATIENT)
Age: 73
End: 2023-03-05

## 2023-05-11 ENCOUNTER — TRANSCRIPTION ENCOUNTER (OUTPATIENT)
Age: 73
End: 2023-05-11

## 2023-05-19 NOTE — PHYSICAL EXAM
[General Appearance - Well Developed] : well developed [General Appearance - Well Nourished] : well nourished [General Appearance - Alert] : alert [General Appearance - In No Acute Distress] : in no acute distress [Sclera] : the sclera and conjunctiva were normal [Extraocular Movements] : extraocular movements were intact [Outer Ear] : the ears and nose were normal in appearance [Hearing Threshold Finger Rub Not [place]] : hearing was normal [] : no respiratory distress [Respiration, Rhythm And Depth] : normal respiratory rhythm and effort [Exaggerated Use Of Accessory Muscles For Inspiration] : no accessory muscle use [Normal] : normal heart rate and rhythm, normal S1 and S2, and no murmurs present [Arterial Pulses Normal] : the arterial pulses were normal [Edema] : no peripheral edema present [Abdomen Soft] : soft [Nondistended] : nondistended [Nail Clubbing] : no clubbing  or cyanosis of the fingernails [Range of Motion to Joints] : range of motion to joints [Motor Tone] : muscle strength and tone were normal [Skin Color & Pigmentation] : normal skin color and pigmentation [No Focal Deficits] : no focal deficits [Oriented To Time, Place, And Person] : oriented to person, place, and time [Affect] : the affect was normal

## 2023-05-21 ENCOUNTER — APPOINTMENT (OUTPATIENT)
Dept: FAMILY MEDICINE | Facility: CLINIC | Age: 73
End: 2023-05-21
Payer: COMMERCIAL

## 2023-05-21 VITALS
HEART RATE: 65 BPM | WEIGHT: 155 LBS | DIASTOLIC BLOOD PRESSURE: 79 MMHG | OXYGEN SATURATION: 98 % | SYSTOLIC BLOOD PRESSURE: 154 MMHG | BODY MASS INDEX: 22.96 KG/M2 | TEMPERATURE: 97.2 F | HEIGHT: 69 IN

## 2023-05-21 VITALS — DIASTOLIC BLOOD PRESSURE: 70 MMHG | SYSTOLIC BLOOD PRESSURE: 114 MMHG

## 2023-05-21 DIAGNOSIS — M72.0 PALMAR FASCIAL FIBROMATOSIS [DUPUYTREN]: ICD-10-CM

## 2023-05-21 DIAGNOSIS — Z00.00 ENCOUNTER FOR GENERAL ADULT MEDICAL EXAMINATION W/OUT ABNORMAL FINDINGS: ICD-10-CM

## 2023-05-21 DIAGNOSIS — N39.3 STRESS INCONTINENCE (FEMALE) (MALE): ICD-10-CM

## 2023-05-21 PROCEDURE — 36415 COLL VENOUS BLD VENIPUNCTURE: CPT

## 2023-05-21 PROCEDURE — 99397 PER PM REEVAL EST PAT 65+ YR: CPT | Mod: 25

## 2023-05-23 ENCOUNTER — NON-APPOINTMENT (OUTPATIENT)
Age: 73
End: 2023-05-23

## 2023-05-23 ENCOUNTER — APPOINTMENT (OUTPATIENT)
Dept: RADIATION ONCOLOGY | Facility: CLINIC | Age: 73
End: 2023-05-23
Payer: COMMERCIAL

## 2023-05-23 VITALS
WEIGHT: 156.53 LBS | DIASTOLIC BLOOD PRESSURE: 88 MMHG | BODY MASS INDEX: 23.12 KG/M2 | TEMPERATURE: 97.16 F | HEART RATE: 72 BPM | SYSTOLIC BLOOD PRESSURE: 148 MMHG | RESPIRATION RATE: 16 BRPM

## 2023-05-23 PROCEDURE — 99213 OFFICE O/P EST LOW 20 MIN: CPT

## 2023-05-23 NOTE — VITALS
[Maximal Pain Intensity: 0/10] : 0/10 [100: Normal, no complaints, no evidence of disease.] : 100: Normal, no complaints, no evidence of disease. [ECOG Performance Status: 0 - Fully active, able to carry on all pre-disease performance without restriction] : Performance Status: 0 - Fully active, able to carry on all pre-disease performance without restriction [2 - Distress Level] : Distress Level: 2

## 2023-05-23 NOTE — HISTORY OF PRESENT ILLNESS
[FreeTextEntry1] : 72 year old male with recurrent adenocarcinoma of the prostate s/p prostatectomy 3/21/22 presents for consideration of salvage RT. PSA  0.36 1/17/23\par \par Brief Oncologic history: \par \par Presurgical PSA was 12.4, with up to G7(3+4) disease on biopsy from 2/8/22. Presurgical MRI from 1/19/22 had shown a 99cc gland with a MN-4 lesion in the right to left posteromedial apex (1.1cm). \par \par He underwent prostatectomy 3/21/22 with Dr. Hutton. Pathology showed a 93 g gland with [id number] G7(3+4) disease, 0/7 nodes involved; positive margin present right apical, left apical, and left posterior. \par \par PSA never undetectable. \par \par iPSA 12.4 10/12/21\par -> prostatectomy 3/21/22\par 0.04 4/29/22\par 0.21 9/8/22\par 0.36 1/17/23\par 0.46 2/10/23\par \par Social Hx: no smoking or drinking\par Occupation: Work at [employer] \par \par 2/10//2023 for evaluation:\par At baseline he feels well, reports no GI/ symptoms. Denies any pain. Has full continence, not actively doing Kegels at this time, and does not use medications for erections. Very active at work, strenuous activities with heavy lifting. \par \par PSMA PET 2/28/23: no evidence of distant recurrence \par Decipher 0.32, low risk \par \par 5/23/23: Follow up/consent signing\par Had to reschedule simulation, but otherwise no real change in baseline GI/ function. Works hard, 4am wakeup and can be pretty tired end of day.

## 2023-05-31 PROCEDURE — 77338 DESIGN MLC DEVICE FOR IMRT: CPT | Mod: 26

## 2023-05-31 PROCEDURE — 77300 RADIATION THERAPY DOSE PLAN: CPT | Mod: 26

## 2023-05-31 PROCEDURE — 77301 RADIOTHERAPY DOSE PLAN IMRT: CPT | Mod: 26

## 2023-06-06 PROCEDURE — 77387B: CUSTOM | Mod: 26

## 2023-06-07 PROCEDURE — 77387B: CUSTOM | Mod: 26

## 2023-06-08 ENCOUNTER — NON-APPOINTMENT (OUTPATIENT)
Age: 73
End: 2023-06-08

## 2023-06-08 PROCEDURE — 77387B: CUSTOM | Mod: 26

## 2023-06-08 NOTE — DISEASE MANAGEMENT
[IIB] : IIB [1] : T1 [c] : c [10-20] : 10 - 20 ng/mL [Biopsy] : Patient had a biopsy on [Biopsy results sent to PCP/Referring Physician] : Biopsy results sent to PCP/Referring Physician [] : Patient had a Prostate MRI [4] : 4 [Radical Prostatectomy] : Radical Prostatectomy [Patient had a radical prostatectomy] : Patient had a radical prostatectomy  [7(3+4)] : Jin Score 7(3+4) [Positive] : Positive margins [2] : T2 [0] : N0 [X] : MX [Pathological] : TNM Stage: p [TTNM] : 2 [NTNM] : 0 [MTNM] : 0 [de-identified] : 750 cGy [de-identified] : 8920 cGy [de-identified] : Prostate Bed [BiopsyDate] : 12/21 [MeasuredProstateVolume] : 99 [TotalCores] : 12 [TotalPositiveCores] : 9 [MaxCoreInvolvement] : 30 [RadicalProstatectomyDate] : 03/22 [TotalNumberofnodesresected] : 7 [PositiveNodes] : 0

## 2023-06-08 NOTE — REVIEW OF SYSTEMS
[Constipation: Grade 0] : Constipation: Grade 0 [Diarrhea: Grade 0] : Diarrhea: Grade 0 [Fatigue: Grade 0] : Fatigue: Grade 0 [Hematuria: Grade 0] : Hematuria: Grade 0 [Urinary Incontinence: Grade 0] : Urinary Incontinence: Grade 0  [Urinary Tract Pain: Grade 0] : Urinary Tract Pain: Grade 0 [Urinary Urgency: Grade 0] : Urinary Urgency: Grade 0 [Urinary Frequency: Grade 0] : Urinary Frequency: Grade 0

## 2023-06-08 NOTE — HISTORY OF PRESENT ILLNESS
[FreeTextEntry1] : 72M w/ history of prostate cancer s/p prostatectomy 3/21/22; pathology showed a 93g gland with [id number] G7(3+4) disease, 0/7 nodes involved; positive margin present right apical, left apical, and left posterior. PSA never undetectable, 0.04 on 4/29/22 and rising to 0.46 2/10/23. PSMA PET 2/28/23 showed no evidence of distant recurrence; Decipher 0.32, low risk. \par Deferred ADT; receiving salvage radiation therapy to prostate bed. \par \par 6/8/23: 3/25 fx: No  issues, although needs to drink more for bladder fill. Daily BM, some gas. Did enema today.

## 2023-06-08 NOTE — VITALS
[Maximal Pain Intensity: 0/10] : 0/10 [ECOG Performance Status: 0 - Fully active, able to carry on all pre-disease performance without restriction] : Performance Status: 0 - Fully active, able to carry on all pre-disease performance without restriction [Least Pain Intensity: 0/10] : 0/10 [90: Able to carry normal activity; minor signs or symptoms of disease.] : 90: Able to carry normal activity; minor signs or symptoms of disease.

## 2023-06-08 NOTE — PHYSICAL EXAM
[General Appearance - Well Developed] : well developed [General Appearance - Well Nourished] : well nourished [General Appearance - Alert] : alert [General Appearance - In No Acute Distress] : in no acute distress [Sclera] : the sclera and conjunctiva were normal [Extraocular Movements] : extraocular movements were intact [Outer Ear] : the ears and nose were normal in appearance [Hearing Threshold Finger Rub Not [place]] : hearing was normal [] : no respiratory distress [Respiration, Rhythm And Depth] : normal respiratory rhythm and effort [Exaggerated Use Of Accessory Muscles For Inspiration] : no accessory muscle use [Normal] : normal heart rate and rhythm, normal S1 and S2, and no murmurs present [Arterial Pulses Normal] : the arterial pulses were normal [Edema] : no peripheral edema present [Abdomen Soft] : soft [Nondistended] : nondistended [Nail Clubbing] : no clubbing  or cyanosis of the fingernails [Range of Motion to Joints] : range of motion to joints [Motor Tone] : muscle strength and tone were normal [Skin Color & Pigmentation] : normal skin color and pigmentation [No Focal Deficits] : no focal deficits [Oriented To Time, Place, And Person] : oriented to person, place, and time [Affect] : the affect was normal

## 2023-06-09 PROCEDURE — 77387B: CUSTOM | Mod: 26

## 2023-06-12 PROCEDURE — 77387B: CUSTOM | Mod: 26

## 2023-06-12 PROCEDURE — 77427 RADIATION TX MANAGEMENT X5: CPT

## 2023-06-13 PROCEDURE — 77387B: CUSTOM | Mod: 26

## 2023-06-14 PROCEDURE — 77387B: CUSTOM | Mod: 26

## 2023-06-15 ENCOUNTER — NON-APPOINTMENT (OUTPATIENT)
Age: 73
End: 2023-06-15

## 2023-06-15 NOTE — PHYSICAL EXAM
[General Appearance - Well Developed] : well developed [General Appearance - Well Nourished] : well nourished [General Appearance - Alert] : alert [General Appearance - In No Acute Distress] : in no acute distress [Sclera] : the sclera and conjunctiva were normal [Extraocular Movements] : extraocular movements were intact [Outer Ear] : the ears and nose were normal in appearance [Hearing Threshold Finger Rub Not [place]] : hearing was normal [] : no respiratory distress [Respiration, Rhythm And Depth] : normal respiratory rhythm and effort [Exaggerated Use Of Accessory Muscles For Inspiration] : no accessory muscle use [Normal] : normal heart rate and rhythm, normal S1 and S2, and no murmurs present [Arterial Pulses Normal] : the arterial pulses were normal [Edema] : no peripheral edema present [Abdomen Soft] : soft [Nondistended] : nondistended [Nail Clubbing] : no clubbing  or cyanosis of the fingernails [Range of Motion to Joints] : range of motion to joints [Motor Tone] : muscle strength and tone were normal [No Focal Deficits] : no focal deficits [Skin Color & Pigmentation] : normal skin color and pigmentation [Oriented To Time, Place, And Person] : oriented to person, place, and time [Affect] : the affect was normal

## 2023-06-15 NOTE — DISEASE MANAGEMENT
[Pathological] : TNM Stage: p [IIB] : IIB [1] : T1 [c] : c [10-20] : 10 - 20 ng/mL [Biopsy] : Patient had a biopsy on [Biopsy results sent to PCP/Referring Physician] : Biopsy results sent to PCP/Referring Physician [] : Patient had a Prostate MRI [4] : 4 [Radical Prostatectomy] : Radical Prostatectomy [Patient had a radical prostatectomy] : Patient had a radical prostatectomy  [7(3+4)] : Jin Score 7(3+4) [Positive] : Positive margins [2] : T2 [0] : N0 [X] : MX [TTNM] : 2 [NTNM] : 0 [MTNM] : 0 [de-identified] : 2000 cGy [de-identified] : 6430 cGy [de-identified] : Prostate Bed [BiopsyDate] : 12/21 [MeasuredProstateVolume] : 99 [TotalCores] : 12 [TotalPositiveCores] : 9 [MaxCoreInvolvement] : 30 [RadicalProstatectomyDate] : 03/22 [TotalNumberofnodesresected] : 7 [PositiveNodes] : 0 normal/well-groomed/no distress

## 2023-06-15 NOTE — HISTORY OF PRESENT ILLNESS
[FreeTextEntry1] : 72M w/ history of prostate cancer s/p prostatectomy 3/21/22; pathology showed a 93g gland with [id number] G7(3+4) disease, 0/7 nodes involved; positive margin present right apical, left apical, and left posterior. PSA never undetectable, 0.04 on 4/29/22 and rising to 0.46 2/10/23. PSMA PET 2/28/23 showed no evidence of distant recurrence; Decipher 0.32, low risk. \par Deferred ADT; receiving salvage radiation therapy to prostate bed. \par \par 6/8/23: 3/25 fx: No  issues, although needs to drink more for bladder fill. Daily BM, some gas. Did enema today.\par \par 6/15/23: 8/25 fx: Tolerating RT. No  symptoms. Bowels ok. Enema as needed, didn't need it today. No gas.

## 2023-06-16 PROCEDURE — 77387B: CUSTOM | Mod: 26

## 2023-06-19 PROCEDURE — 77427 RADIATION TX MANAGEMENT X5: CPT

## 2023-06-19 PROCEDURE — 77387B: CUSTOM | Mod: 26

## 2023-06-20 PROCEDURE — 77387B: CUSTOM | Mod: 26

## 2023-06-21 ENCOUNTER — NON-APPOINTMENT (OUTPATIENT)
Age: 73
End: 2023-06-21

## 2023-06-21 PROCEDURE — 77387B: CUSTOM | Mod: 26

## 2023-06-21 NOTE — HISTORY OF PRESENT ILLNESS
[FreeTextEntry1] : 72M w/ history of prostate cancer s/p prostatectomy 3/21/22; pathology showed a 93g gland with [id number] G7(3+4) disease, 0/7 nodes involved; positive margin present right apical, left apical, and left posterior. PSA never undetectable, 0.04 on 4/29/22 and rising to 0.46 2/10/23. PSMA PET 2/28/23 showed no evidence of distant recurrence; Decipher 0.32, low risk. \par Deferred ADT; receiving salvage radiation therapy to prostate bed. \par \par 6/8/23: 3/25 fx: No  issues, although needs to drink more for bladder fill. Daily BM, some gas. Did enema today.\par \par 6/15/23: 8/25 fx: Tolerating RT. No  symptoms. Bowels ok. Enema as needed, didn't need it today. No gas.\par 6/21/23 OTV. 12/25 Fx completed for prostate cancer. No urinary or bowel movement bother.

## 2023-06-21 NOTE — HISTORY OF PRESENT ILLNESS
[FreeTextEntry1] : 72M w/ history of prostate cancer s/p prostatectomy 3/21/22; pathology showed a 93g gland with [id number] G7(3+4) disease, 0/7 nodes involved; positive margin present right apical, left apical, and left posterior. PSA never undetectable, 0.04 on 4/29/22 and rising to 0.46 2/10/23. PSMA PET 2/28/23 showed no evidence of distant recurrence; Decipher 0.32, low risk. \par Deferred ADT; receiving salvage radiation therapy to prostate bed. \par \par 6/8/23: 3/25 fx: No  issues, although needs to drink more for bladder fill. Daily BM, some gas. Did enema today.\par \par 6/15/23: 8/25 fx: Tolerating RT. No  symptoms. Bowels ok. Enema as needed, didn't need it today. No gas.\par 6/21/23 OTV. 12/25 Fx completed for prostate cancer. No urinary or bowel movement bother.

## 2023-06-21 NOTE — DISEASE MANAGEMENT
[Pathological] : TNM Stage: p [TTNM] : 2 [NTNM] : 0 [MTNM] : 0 [IIB] : IIB [de-identified] : 2000 cGy [de-identified] : 0320 cGy [de-identified] : Prostate Bed [1] : T1 [c] : c [10-20] : 10 - 20 ng/mL [Biopsy] : Patient had a biopsy on [Biopsy results sent to PCP/Referring Physician] : Biopsy results sent to PCP/Referring Physician [] : Patient had a Prostate MRI [4] : 4 [BiopsyDate] : 12/21 [MeasuredProstateVolume] : 99 [TotalCores] : 12 [TotalPositiveCores] : 9 [MaxCoreInvolvement] : 30 [Radical Prostatectomy] : Radical Prostatectomy [Patient had a radical prostatectomy] : Patient had a radical prostatectomy  [7(3+4)] : Jin Score 7(3+4) [Positive] : Positive margins [2] : T2 [0] : N0 [X] : MX [RadicalProstatectomyDate] : 03/22 [TotalNumberofnodesresected] : 7 [PositiveNodes] : 0

## 2023-06-21 NOTE — DISEASE MANAGEMENT
[Pathological] : TNM Stage: p [TTNM] : 2 [NTNM] : 0 [MTNM] : 0 [IIB] : IIB [de-identified] : 2000 cGy [de-identified] : 7360 cGy [de-identified] : Prostate Bed [1] : T1 [c] : c [10-20] : 10 - 20 ng/mL [Biopsy] : Patient had a biopsy on [Biopsy results sent to PCP/Referring Physician] : Biopsy results sent to PCP/Referring Physician [] : Patient had a Prostate MRI [4] : 4 [BiopsyDate] : 12/21 [MeasuredProstateVolume] : 99 [TotalCores] : 12 [TotalPositiveCores] : 9 [MaxCoreInvolvement] : 30 [Radical Prostatectomy] : Radical Prostatectomy [Patient had a radical prostatectomy] : Patient had a radical prostatectomy  [7(3+4)] : Jin Score 7(3+4) [Positive] : Positive margins [2] : T2 [0] : N0 [X] : MX [RadicalProstatectomyDate] : 03/22 [TotalNumberofnodesresected] : 7 [PositiveNodes] : 0

## 2023-06-21 NOTE — VITALS
[ECOG Performance Status: 0 - Fully active, able to carry on all pre-disease performance without restriction] : Performance Status: 0 - Fully active, able to carry on all pre-disease performance without restriction [Maximal Pain Intensity: 0/10] : 0/10 [Least Pain Intensity: 0/10] : 0/10 [90: Able to carry normal activity; minor signs or symptoms of disease.] : 90: Able to carry normal activity; minor signs or symptoms of disease.  [ECOG Performance Status: 1 - Restricted in physically strenuous activity but ambulatory and able to carry out work of a light or sedentary nature] : Performance Status: 1 - Restricted in physically strenuous activity but ambulatory and able to carry out work of a light or sedentary nature, e.g., light house work, office work

## 2023-06-22 PROCEDURE — 77387B: CUSTOM | Mod: 26

## 2023-06-23 PROCEDURE — 77387B: CUSTOM | Mod: 26

## 2023-06-26 PROCEDURE — 77427 RADIATION TX MANAGEMENT X5: CPT

## 2023-06-26 PROCEDURE — 77387B: CUSTOM | Mod: 26

## 2023-06-27 PROCEDURE — 77387B: CUSTOM | Mod: 26

## 2023-06-28 ENCOUNTER — NON-APPOINTMENT (OUTPATIENT)
Age: 73
End: 2023-06-28

## 2023-06-28 PROCEDURE — 77387B: CUSTOM | Mod: 26

## 2023-06-28 NOTE — PHYSICAL EXAM
[Normal] : well developed, well nourished, in no acute distress [Sclera] : the sclera and conjunctiva were normal [] : no respiratory distress [Abdomen Soft] : soft [Nondistended] : nondistended

## 2023-06-28 NOTE — DISEASE MANAGEMENT
[Pathological] : TNM Stage: p [IIB] : IIB [1] : T1 [c] : c [10-20] : 10 - 20 ng/mL [Biopsy] : Patient had a biopsy on [Biopsy results sent to PCP/Referring Physician] : Biopsy results sent to PCP/Referring Physician [] : Patient had a Prostate MRI [4] : 4 [Radical Prostatectomy] : Radical Prostatectomy [Patient had a radical prostatectomy] : Patient had a radical prostatectomy  [7(3+4)] : Jin Score 7(3+4) [Positive] : Positive margins [2] : T2 [0] : N0 [X] : MX [BiopsyDate] : 12/21 [MeasuredProstateVolume] : 99 [TotalCores] : 12 [TotalPositiveCores] : 9 [MaxCoreInvolvement] : 30 [RadicalProstatectomyDate] : 03/22 [TotalNumberofnodesresected] : 7 [PositiveNodes] : 0 [TTNM] : 2 [NTNM] : 0 [MTNM] : 0 [de-identified] : 4250 cGy [de-identified] : 5410 cGy [de-identified] : Prostate Bed

## 2023-06-28 NOTE — HISTORY OF PRESENT ILLNESS
[FreeTextEntry1] : 72M w/ history of prostate cancer s/p prostatectomy 3/21/22; pathology showed a 93g gland with [id number] G7(3+4) disease, 0/7 nodes involved; positive margin present right apical, left apical, and left posterior. PSA never undetectable, 0.04 on 4/29/22 and rising to 0.46 2/10/23. PSMA PET 2/28/23 showed no evidence of distant recurrence; Decipher 0.32, low risk. \par Deferred ADT; receiving salvage radiation therapy to prostate bed. \par \par 6/8/23: 3/25 fx: No  issues, although needs to drink more for bladder fill. Daily BM, some gas. Did enema today.\par \par 6/15/23: 8/25 fx: Tolerating RT. No  symptoms. Bowels ok. Enema as needed, didn't need it today. No gas.\par \par 6/21/23 OTV. 12/25 Fx completed for prostate cancer. No urinary or bowel movement bother.   \par \par 6/28/23 OTV. 17/25 Fx completed for prostate cancer. Feeling well. Denies dysuria, diarrhea, blood in urine or stool. Stream is good.

## 2023-06-29 PROCEDURE — 77387B: CUSTOM | Mod: 26

## 2023-06-30 PROCEDURE — 77387B: CUSTOM | Mod: 26

## 2023-07-03 PROCEDURE — 77427 RADIATION TX MANAGEMENT X5: CPT

## 2023-07-03 PROCEDURE — 77387B: CUSTOM | Mod: 26

## 2023-07-05 PROCEDURE — 77387B: CUSTOM | Mod: 26

## 2023-07-06 ENCOUNTER — NON-APPOINTMENT (OUTPATIENT)
Age: 73
End: 2023-07-06

## 2023-07-06 PROCEDURE — 77387B: CUSTOM | Mod: 26

## 2023-07-06 NOTE — PHYSICAL EXAM
Called and spoke with [facility] Elba Díaz PA-C  [facility] [id number] [place] [address] [phone]     [Normal] : well developed, well nourished, in no acute distress [Sclera] : the sclera and conjunctiva were normal [Outer Ear] : the ears and nose were normal in appearance [Extraocular Movements] : extraocular movements were intact [Hearing Threshold Finger Rub Not [place]] : hearing was normal [] : no respiratory distress [Respiration, Rhythm And Depth] : normal respiratory rhythm and effort [Abdomen Soft] : soft [Nondistended] : nondistended [Nail Clubbing] : no clubbing  or cyanosis of the fingernails [Range of Motion to Joints] : range of motion to joints [Affect] : the affect was normal [Oriented To Time, Place, And Person] : oriented to person, place, and time [Exaggerated Use Of Accessory Muscles For Inspiration] : no accessory muscle use [Arterial Pulses Normal] : the arterial pulses were normal [Edema] : no peripheral edema present [Skin Color & Pigmentation] : normal skin color and pigmentation [No Focal Deficits] : no focal deficits

## 2023-07-06 NOTE — REVIEW OF SYSTEMS
[Constipation: Grade 0] : Constipation: Grade 0 [Diarrhea: Grade 0] : Diarrhea: Grade 0 [Hematuria: Grade 0] : Hematuria: Grade 0 [Urinary Incontinence: Grade 0] : Urinary Incontinence: Grade 0  [Urinary Tract Pain: Grade 0] : Urinary Tract Pain: Grade 0 [Fatigue: Grade 1 - Fatigue relieved by rest] : Fatigue: Grade 1 - Fatigue relieved by rest [Urinary Urgency: Grade 1 - Present] : Urinary Urgency: Grade 1 - Present [Urinary Frequency: Grade 1 - Present] : Urinary Frequency: Grade 1 - Present

## 2023-07-06 NOTE — DISEASE MANAGEMENT
[Pathological] : TNM Stage: p [IIB] : IIB [TTNM] : 2 [NTNM] : 0 [MTNM] : 0 [de-identified] :  5500 cGy [de-identified] : 1720 cGy [de-identified] : Prostate Bed

## 2023-07-07 PROCEDURE — 77387B: CUSTOM | Mod: 26

## 2023-07-10 PROCEDURE — 77387B: CUSTOM | Mod: 26

## 2023-07-11 PROCEDURE — 77387B: CUSTOM | Mod: 26

## 2023-07-11 PROCEDURE — 77427 RADIATION TX MANAGEMENT X5: CPT

## 2023-08-23 ENCOUNTER — APPOINTMENT (OUTPATIENT)
Dept: RADIATION ONCOLOGY | Facility: CLINIC | Age: 73
End: 2023-08-23
Payer: COMMERCIAL

## 2023-08-23 VITALS
RESPIRATION RATE: 18 BRPM | SYSTOLIC BLOOD PRESSURE: 126 MMHG | BODY MASS INDEX: 22.79 KG/M2 | DIASTOLIC BLOOD PRESSURE: 81 MMHG | TEMPERATURE: 99 F | WEIGHT: 153.88 LBS | HEIGHT: 69 IN | OXYGEN SATURATION: 98 % | HEART RATE: 77 BPM

## 2023-08-23 PROCEDURE — 99024 POSTOP FOLLOW-UP VISIT: CPT

## 2023-08-23 RX ORDER — ELECTROLYTES/DEXTROSE
SOLUTION, ORAL ORAL DAILY
Refills: 0 | Status: ACTIVE | COMMUNITY
Start: 2023-08-23

## 2023-08-23 RX ORDER — PSYLLIUM HUSK 0.4 G
CAPSULE ORAL
Qty: 1 | Refills: 0 | Status: ACTIVE | COMMUNITY
Start: 2023-08-23

## 2023-08-23 NOTE — VITALS
14-Jul-2018 [Maximal Pain Intensity: 0/10] : 0/10 [Least Pain Intensity: 0/10] : 0/10 [90: Able to carry normal activity; minor signs or symptoms of disease.] : 90: Able to carry normal activity; minor signs or symptoms of disease.  [ECOG Performance Status: 0 - Fully active, able to carry on all pre-disease performance without restriction] : Performance Status: 0 - Fully active, able to carry on all pre-disease performance without restriction

## 2023-08-23 NOTE — HISTORY OF PRESENT ILLNESS
[FreeTextEntry1] : 72-year-old male with prostate cancer (up to G7(3+4) disease on biopsy 2/8/22, iPSA 12.4), now s/p prostatectomy 3/21/22; pathology showed a 93g gland with [id number] G7(3+4) disease, 0/7 nodes involved; positive margin present right apical, left apical, and left posterior. PSA never undetectable, 0.04 on 4/29/22 and rising to 0.36 most recently 1/17/23. Decipher 0.32, low risk. PSMA 2/28/23 showed no distant disease. Salvage radiation therapy to the prostate bed was recommended. ,  Radiation Treatment Summary: Prostate Bed 6,000 cGy from 6/06/2023 to 7/10/2023 Clinical Course: Tolerated the treatment well and developed the expected side effects, including fatigue, urinary urgency/frequency, and some bowel disturbance.   8/23/2023: PTE Notes  symptoms at baseline. Stable post void dribbling.  Not doing Kegels.  Has had pelvic floor rehab post prostatectomy which was helpful. Denies dysuria, hematuria or bowel symptoms. IPSS/QOL/EPIC Score 6/3/10.    .

## 2023-08-23 NOTE — REVIEW OF SYSTEMS
[Constipation: Grade 0] : Constipation: Grade 0 [Diarrhea: Grade 0] : Diarrhea: Grade 0 [Hematuria: Grade 0] : Hematuria: Grade 0 [Urinary Tract Pain: Grade 0] : Urinary Tract Pain: Grade 0 [Urinary Urgency: Grade 1 - Present] : Urinary Urgency: Grade 1 - Present [Urinary Frequency: Grade 1 - Present] : Urinary Frequency: Grade 1 - Present [Urinary Incontinence: Grade 1 - Occasional (e.g., with coughing, sneezing, etc.), pads not indicated] : Urinary Incontinence: Grade 1 - Occasional (e.g., with coughing, sneezing, etc.), pads not indicated [Fatigue: Grade 0] : Fatigue: Grade 0

## 2023-08-23 NOTE — PHYSICAL EXAM
[Normal] : well developed, well nourished, in no acute distress [Sclera] : the sclera and conjunctiva were normal [Extraocular Movements] : extraocular movements were intact [Outer Ear] : the ears and nose were normal in appearance [Hearing Threshold Finger Rub Not [place]] : hearing was normal [] : no respiratory distress [Respiration, Rhythm And Depth] : normal respiratory rhythm and effort [Exaggerated Use Of Accessory Muscles For Inspiration] : no accessory muscle use [Abdomen Soft] : soft [Nondistended] : nondistended [Range of Motion to Joints] : range of motion to joints [Skin Color & Pigmentation] : normal skin color and pigmentation [No Focal Deficits] : no focal deficits [Oriented To Time, Place, And Person] : oriented to person, place, and time [Affect] : the affect was normal [Nail Clubbing] : no clubbing  or cyanosis of the fingernails

## 2023-12-16 ENCOUNTER — TRANSCRIPTION ENCOUNTER (OUTPATIENT)
Age: 73
End: 2023-12-16

## 2023-12-16 LAB — PSA SERPL-MCNC: 0.03 NG/ML

## 2023-12-26 NOTE — HISTORY OF PRESENT ILLNESS
[FreeTextEntry1] : 72-year-old male with prostate cancer (up to G7(3+4) disease on biopsy 2/8/22, iPSA 12.4), now s/p prostatectomy 3/21/22; pathology showed a 93g gland with [id number] G7(3+4) disease, 0/7 nodes involved; positive margin present right apical, left apical, and left posterior. PSA never undetectable, 0.04 on 4/29/22 and rising to 0.36 most recently 1/17/23. Decipher 0.32, low risk. PSMA 2/28/23 showed no distant disease. Salvage radiation therapy to the prostate bed was recommended. ,  Radiation Treatment Summary: Prostate Bed 6,000 cGy from 6/06/2023 to 7/10/2023 Clinical Course: Tolerated the treatment well and developed the expected side effects, including fatigue, urinary urgency/frequency, and some bowel disturbance.   8/23/2023: PTE Notes  symptoms at baseline. Stable post void dribbling.  Not doing Kegels.  Has had pelvic floor rehab post prostatectomy which was helpful. Denies dysuria, hematuria or bowel symptoms. IPSS/QOL/EPIC Score 6/3/10.   1/2/24: Pt. presents for follow up visit IPSS/QOL/EPIC*** .

## 2023-12-26 NOTE — REVIEW OF SYSTEMS
[Constipation: Grade 0] : Constipation: Grade 0 [Diarrhea: Grade 0] : Diarrhea: Grade 0 [Fatigue: Grade 0] : Fatigue: Grade 0 [Hematuria: Grade 0] : Hematuria: Grade 0 [Urinary Incontinence: Grade 1 - Occasional (e.g., with coughing, sneezing, etc.), pads not indicated] : Urinary Incontinence: Grade 1 - Occasional (e.g., with coughing, sneezing, etc.), pads not indicated [Urinary Tract Pain: Grade 0] : Urinary Tract Pain: Grade 0 [Urinary Urgency: Grade 1 - Present] : Urinary Urgency: Grade 1 - Present [Urinary Frequency: Grade 1 - Present] : Urinary Frequency: Grade 1 - Present

## 2023-12-26 NOTE — PHYSICAL EXAM
[Normal] : well developed, well nourished, in no acute distress [Sclera] : the sclera and conjunctiva were normal [Extraocular Movements] : extraocular movements were intact [Outer Ear] : the ears and nose were normal in appearance [Hearing Threshold Finger Rub Not [place]] : hearing was normal [] : no respiratory distress [Respiration, Rhythm And Depth] : normal respiratory rhythm and effort [Exaggerated Use Of Accessory Muscles For Inspiration] : no accessory muscle use [Abdomen Soft] : soft [Nondistended] : nondistended [Nail Clubbing] : no clubbing  or cyanosis of the fingernails [Range of Motion to Joints] : range of motion to joints [Skin Color & Pigmentation] : normal skin color and pigmentation [No Focal Deficits] : no focal deficits [Oriented To Time, Place, And Person] : oriented to person, place, and time [Affect] : the affect was normal

## 2024-01-02 ENCOUNTER — NON-APPOINTMENT (OUTPATIENT)
Age: 74
End: 2024-01-02

## 2024-01-02 ENCOUNTER — APPOINTMENT (OUTPATIENT)
Dept: RADIATION ONCOLOGY | Facility: CLINIC | Age: 74
End: 2024-01-02
Payer: COMMERCIAL

## 2024-01-02 ENCOUNTER — OUTPATIENT (OUTPATIENT)
Dept: OUTPATIENT SERVICES | Facility: HOSPITAL | Age: 74
LOS: 1 days | Discharge: HOME | End: 2024-01-02

## 2024-01-02 VITALS
HEIGHT: 69 IN | BODY MASS INDEX: 23.77 KG/M2 | OXYGEN SATURATION: 99 % | WEIGHT: 160.48 LBS | SYSTOLIC BLOOD PRESSURE: 142 MMHG | HEART RATE: 80 BPM | RESPIRATION RATE: 16 BRPM | DIASTOLIC BLOOD PRESSURE: 80 MMHG

## 2024-01-02 DIAGNOSIS — H26.9 UNSPECIFIED CATARACT: Chronic | ICD-10-CM

## 2024-01-02 DIAGNOSIS — C61 MALIGNANT NEOPLASM OF PROSTATE: ICD-10-CM

## 2024-01-02 DIAGNOSIS — Z98.890 OTHER SPECIFIED POSTPROCEDURAL STATES: Chronic | ICD-10-CM

## 2024-01-02 PROCEDURE — 99213 OFFICE O/P EST LOW 20 MIN: CPT

## 2024-01-02 NOTE — REVIEW OF SYSTEMS
[Constipation: Grade 0] : Constipation: Grade 0 [Diarrhea: Grade 0] : Diarrhea: Grade 0 [Hematuria: Grade 0] : Hematuria: Grade 0 [Urinary Incontinence: Grade 1 - Occasional (e.g., with coughing, sneezing, etc.), pads not indicated] : Urinary Incontinence: Grade 1 - Occasional (e.g., with coughing, sneezing, etc.), pads not indicated [Urinary Tract Pain: Grade 0] : Urinary Tract Pain: Grade 0 [Urinary Urgency: Grade 1 - Present] : Urinary Urgency: Grade 1 - Present [Urinary Frequency: Grade 1 - Present] : Urinary Frequency: Grade 1 - Present [Fatigue: Grade 0] : Fatigue: Grade 0 [Nocturia] : nocturia [Urinary Frequency] : urinary frequency [IPSS Score (0-40): ___] : IPSS score: [unfilled] [EPIC-CP Score (0-60): ___] : EPIC-CP score: [unfilled] [Negative] : Allergic/Immunologic [Urinary Ostomy] : no ~T urinary ostomy present

## 2024-01-02 NOTE — HISTORY OF PRESENT ILLNESS
[FreeTextEntry1] : 73-year-old male with prostate cancer (up to G7(3+4) disease on biopsy 2/8/22, iPSA 12.4), now s/p prostatectomy 3/21/22; pathology showed a 93g gland with [id number] G7(3+4) disease, 0/7 nodes involved; positive margin present right apical, left apical, and left posterior. PSA never undetectable, 0.04 on 4/29/22 and rising to 0.36 most recently 1/17/23. Decipher 0.32, low risk. PSMA 2/28/23 showed no distant disease. Salvage radiation therapy to the prostate bed was recommended.  Radiation Treatment Summary: Prostate Bed 6,000 cGy from 6/06/2023 to 7/10/2023 Clinical Course: Tolerated the treatment well and developed the expected side effects, including fatigue, urinary urgency/frequency, and some bowel disturbance.   8/23/2023: PTE Notes  symptoms at baseline. Stable post void dribbling. Not doing Kegels.  Has had pelvic floor rehab post prostatectomy which was helpful. Denies dysuria, hematuria or bowel symptoms.  IPSS/QOL/EPIC Score 6/3/10.   PSA trend:  Pre salvage PSA 0.46 2/10/23 -> RT complete 7/10/23 0.03 12/15/23  1/2/24: Pt. presents for follow up visit.  Patient with complaints of nocturia 2-3 times a night and occasional urgency. Otherwise he is doing well and does not have any other complaints.  IPSS/QOL/EPIC 7/2/11

## 2024-01-02 NOTE — DISEASE MANAGEMENT
[Pathological] : TNM Stage: p [IIB] : IIB [Radical Prostatectomy] : Radical Prostatectomy [Patient had a radical prostatectomy] : Patient had a radical prostatectomy  [7(3+4)] : Jin Score 7(3+4) [Positive] : Positive margins [2] : T2 [0] : M0 [TTNM] : 2 [NTNM] : 0 [MTNM] : 0 [RadicalProstatectomyDate] : 3/21/22 [TotalNumberofnodesresected] : 7 [PositiveNodes] : 0 [RecurrenceDate] : 1/17/23

## 2024-01-02 NOTE — END OF VISIT
[FreeTextEntry3] : I saw and examined this patient on the date of service with my assigned resident physician, Dr. Damian Guzmán. I was involved in all procedures and laboratory/radiographic assessments. I personally confirmed pertinent history and exam findings and reviewed the patient's diagnosis and plan with them. I have reviewed and edited the resident note and agree with their overall plan. Additional comments below.  73M w/ history of prostate cancer s/p prostatectomy 3/21/22; pathology showed a 93g gland with [id number] G7(3+4) disease, 0/7 nodes involved; positive margin present right apical, left apical, and left posterior. PSA never undetectable, 0.04 on 4/29/22 and rising to 0.46 2/10/23. PSMA PET 2/28/23 showed no evidence of distant recurrence; Decipher 0.32, low risk. ADT deferred, treated prostate bed with hypofractionated salvage radiation therapy ending 7/10/23.   Doing well at this time, at baseline. Reviewed supportive medications. Reasonable PSA response; we will check it again and followup in about 3 months.   Paul Hanley MD PhD  and Chief of Brachytherapy Department of Radiation Medicine [facility] Tel: [phone]  [Time Spent: ___ minutes] : I have spent [unfilled] minutes of time on the encounter.

## 2024-01-02 NOTE — PHYSICAL EXAM
[Normal] : well developed, well nourished, in no acute distress [Sclera] : the sclera and conjunctiva were normal [Extraocular Movements] : extraocular movements were intact [Outer Ear] : the ears and nose were normal in appearance [Hearing Threshold Finger Rub Not [place]] : hearing was normal [] : no respiratory distress [Respiration, Rhythm And Depth] : normal respiratory rhythm and effort [Exaggerated Use Of Accessory Muscles For Inspiration] : no accessory muscle use [Abdomen Soft] : soft [Nondistended] : nondistended [Nail Clubbing] : no clubbing  or cyanosis of the fingernails [Range of Motion to Joints] : range of motion to joints [Skin Color & Pigmentation] : normal skin color and pigmentation [No Focal Deficits] : no focal deficits [Oriented To Time, Place, And Person] : oriented to person, place, and time [Affect] : the affect was normal [Arterial Pulses Normal] : the arterial pulses were normal [Edema] : no peripheral edema present [Motor Tone] : muscle strength and tone were normal [Not Anxious] : not anxious

## 2024-04-04 ENCOUNTER — APPOINTMENT (OUTPATIENT)
Dept: FAMILY MEDICINE | Facility: CLINIC | Age: 74
End: 2024-04-04
Payer: COMMERCIAL

## 2024-04-04 VITALS
HEART RATE: 77 BPM | BODY MASS INDEX: 23.7 KG/M2 | SYSTOLIC BLOOD PRESSURE: 158 MMHG | WEIGHT: 160 LBS | HEIGHT: 69 IN | TEMPERATURE: 98.2 F | OXYGEN SATURATION: 96 % | DIASTOLIC BLOOD PRESSURE: 83 MMHG

## 2024-04-04 DIAGNOSIS — C61 MALIGNANT NEOPLASM OF PROSTATE: ICD-10-CM

## 2024-04-04 DIAGNOSIS — K21.9 GASTRO-ESOPHAGEAL REFLUX DISEASE W/OUT ESOPHAGITIS: ICD-10-CM

## 2024-04-04 PROCEDURE — 99213 OFFICE O/P EST LOW 20 MIN: CPT

## 2024-04-04 RX ORDER — BETAMETHASONE DIPROPIONATE 0.5 MG/G
0.05 CREAM TOPICAL DAILY
Qty: 1 | Refills: 0 | Status: ACTIVE | COMMUNITY
Start: 2024-04-04 | End: 1900-01-01

## 2024-04-04 RX ORDER — OMEPRAZOLE 40 MG/1
40 CAPSULE, DELAYED RELEASE ORAL
Qty: 30 | Refills: 0 | Status: ACTIVE | COMMUNITY
Start: 2024-04-04 | End: 1900-01-01

## 2024-04-18 ENCOUNTER — TRANSCRIPTION ENCOUNTER (OUTPATIENT)
Age: 74
End: 2024-04-18

## 2024-04-18 LAB — PSA SERPL-MCNC: 0.03 NG/ML

## 2024-04-23 ENCOUNTER — APPOINTMENT (OUTPATIENT)
Dept: RADIATION ONCOLOGY | Facility: CLINIC | Age: 74
End: 2024-04-23
Payer: COMMERCIAL

## 2024-04-23 VITALS
TEMPERATURE: 97.7 F | OXYGEN SATURATION: 97 % | HEART RATE: 80 BPM | BODY MASS INDEX: 24.41 KG/M2 | WEIGHT: 161.05 LBS | HEIGHT: 68 IN | SYSTOLIC BLOOD PRESSURE: 143 MMHG | DIASTOLIC BLOOD PRESSURE: 82 MMHG | RESPIRATION RATE: 16 BRPM

## 2024-04-23 DIAGNOSIS — Z92.3 PERSONAL HISTORY OF IRRADIATION: ICD-10-CM

## 2024-04-23 PROCEDURE — 99213 OFFICE O/P EST LOW 20 MIN: CPT

## 2024-04-23 NOTE — PHYSICAL EXAM
[Normal] : well developed, well nourished, in no acute distress [Sclera] : the sclera and conjunctiva were normal [Extraocular Movements] : extraocular movements were intact [Outer Ear] : the ears and nose were normal in appearance [Hearing Threshold Finger Rub Not [place]] : hearing was normal [] : no respiratory distress [Respiration, Rhythm And Depth] : normal respiratory rhythm and effort [Exaggerated Use Of Accessory Muscles For Inspiration] : no accessory muscle use [Arterial Pulses Normal] : the arterial pulses were normal [Edema] : no peripheral edema present [Abdomen Soft] : soft [Nondistended] : nondistended [Nail Clubbing] : no clubbing  or cyanosis of the fingernails [Range of Motion to Joints] : range of motion to joints [Motor Tone] : muscle strength and tone were normal [Skin Color & Pigmentation] : normal skin color and pigmentation [No Focal Deficits] : no focal deficits [Oriented To Time, Place, And Person] : oriented to person, place, and time [Affect] : the affect was normal [Not Anxious] : not anxious [General Appearance - Well Developed] : well developed

## 2024-04-25 NOTE — REVIEW OF SYSTEMS
[Nocturia] : nocturia [Urinary Frequency] : urinary frequency [IPSS Score (0-40): ___] : IPSS score: [unfilled] [EPIC-CP Score (0-60): ___] : EPIC-CP score: [unfilled] [Negative] : Allergic/Immunologic [Constipation: Grade 0] : Constipation: Grade 0 [Diarrhea: Grade 0] : Diarrhea: Grade 0 [Hematuria: Grade 0] : Hematuria: Grade 0 [Urinary Incontinence: Grade 1 - Occasional (e.g., with coughing, sneezing, etc.), pads not indicated] : Urinary Incontinence: Grade 1 - Occasional (e.g., with coughing, sneezing, etc.), pads not indicated [Urinary Tract Pain: Grade 0] : Urinary Tract Pain: Grade 0 [Urinary Urgency: Grade 1 - Present] : Urinary Urgency: Grade 1 - Present [Urinary Frequency: Grade 1 - Present] : Urinary Frequency: Grade 1 - Present [Fatigue: Grade 0] : Fatigue: Grade 0 [Urinary Ostomy] : no ~T urinary ostomy present

## 2024-04-25 NOTE — HISTORY OF PRESENT ILLNESS
[FreeTextEntry1] : 73-year-old male with prostate cancer (up to G7(3+4) disease on biopsy 2/8/22, iPSA 12.4), now s/p prostatectomy 3/21/22; pathology showed a 93g gland with [id number] G7(3+4) disease, 0/7 nodes involved; positive margin present right apical, left apical, and left posterior. PSA never undetectable, 0.04 on 4/29/22 and rising to 0.36 most recently 1/17/23. Decipher 0.32, low risk. PSMA 2/28/23 showed no distant disease. Salvage radiation therapy to the prostate bed was recommended.  Radiation Treatment Summary: Prostate Bed 6,250 cGy over 25 Fxs from 6/06/2023 to 7/11/2023 Clinical Course: Tolerated the treatment well and developed the expected side effects, including fatigue, urinary urgency/frequency, and some bowel disturbance.   8/23/2023: PTE Notes  symptoms at baseline. Stable post void dribbling. Not doing Kegels.  Has had pelvic floor rehab post prostatectomy which was helpful. Denies dysuria, hematuria or bowel symptoms.  IPSS/QOL/EPIC Score 6/3/10.   1/2/24: Pt. presents for follow up visit.  Patient with complaints of nocturia 2-3 times a night and occasional urgency. Otherwise, he is doing well and does not have any other complaints.  IPSS/QOL/EPIC 7/2/11  PSA trend:  Pre salvage PSA 0.46 2/10/23 -> RT complete 7/11/23 0.03 12/15/23 0.03 4/17/24  Follow-up 4/23/24: Patient presents for progress check and evaluation Now s/p adjuvant radiation Prostate Bed 6,250 cGy over 25 Fxs from 6/06/2023 to 7/11/2023. Doing well overall. Continues with nocturia ~ 3x/night noticing he leaks if he misses a trip to the bathroom. "It's annoying" Denies frequency he is able to go throughout the day w/o needing to urinate ~ twice/day more depending on his liquid intake. Denies hematuria/dysuria/constipation. IPSS/QOL/EPIC 6/3/11

## 2024-07-25 ENCOUNTER — LABORATORY RESULT (OUTPATIENT)
Age: 74
End: 2024-07-25

## 2024-08-22 ENCOUNTER — APPOINTMENT (OUTPATIENT)
Dept: RADIATION ONCOLOGY | Facility: CLINIC | Age: 74
End: 2024-08-22
Payer: COMMERCIAL

## 2024-08-22 VITALS
HEIGHT: 68 IN | HEART RATE: 76 BPM | TEMPERATURE: 97.16 F | DIASTOLIC BLOOD PRESSURE: 74 MMHG | SYSTOLIC BLOOD PRESSURE: 145 MMHG | RESPIRATION RATE: 16 BRPM | BODY MASS INDEX: 23.49 KG/M2 | OXYGEN SATURATION: 95 % | WEIGHT: 155 LBS

## 2024-08-22 PROCEDURE — 99214 OFFICE O/P EST MOD 30 MIN: CPT

## 2024-08-26 NOTE — REVIEW OF SYSTEMS
[Nocturia] : nocturia [Urinary Frequency] : urinary frequency [IPSS Score (0-40): ___] : IPSS score: [unfilled] [EPIC-CP Score (0-60): ___] : EPIC-CP score: [unfilled] [Negative] : Neurological [Constipation: Grade 0] : Constipation: Grade 0 [Diarrhea: Grade 0] : Diarrhea: Grade 0 [Hematuria: Grade 0] : Hematuria: Grade 0 [Urinary Incontinence: Grade 1 - Occasional (e.g., with coughing, sneezing, etc.), pads not indicated] : Urinary Incontinence: Grade 1 - Occasional (e.g., with coughing, sneezing, etc.), pads not indicated [Urinary Tract Pain: Grade 0] : Urinary Tract Pain: Grade 0 [Urinary Urgency: Grade 1 - Present] : Urinary Urgency: Grade 1 - Present [Urinary Frequency: Grade 1 - Present] : Urinary Frequency: Grade 1 - Present [Fatigue: Grade 0] : Fatigue: Grade 0 [Urinary Retention: Grade 0] : Urinary Retention: Grade 0 [Ejaculation Disorder: Grade 1 - Diminished ejaculation] : Ejaculation Disorder: Grade 1 - Diminished ejaculation  [Erectile Dysfunction: Grade 1 - Decrease in erectile function (frequency or rigidity of erections) but intervention not indicated (e.g., medication or use of mechanical device, penile pump)] : Erectile Dysfunction: Grade 1 - Decrease in erectile function (frequency or rigidity of erections) but intervention not indicated (e.g., medication or use of mechanical device, penile pump) [Urinary Ostomy] : no ~T urinary ostomy present

## 2024-08-26 NOTE — HISTORY OF PRESENT ILLNESS
[FreeTextEntry1] : Mr. Beatty was previously a patient of Dr. Paul Hanley whom I am seeing in follow up post-radiation therapy.   73-year-old male with prostate cancer (up to G7(3+4) disease on biopsy 2/8/22, iPSA 12.4), now s/p prostatectomy 3/21/22; pathology showed a 93g gland with [id number] G7(3+4) disease, 0/7 nodes involved; positive margin present right apical, left apical, and left posterior. PSA never undetectable, 0.04 on 4/29/22 and rising to 0.36 most recently 1/17/23. Decipher 0.32, low risk. PSMA 2/28/23 showed no distant disease. Salvage radiation therapy to the prostate bed was recommended.  Radiation Treatment Summary: Prostate Bed 6,250 cGy over 25 Fxs from 6/06/2023 to 7/11/2023 Clinical Course: Tolerated the treatment well and developed the expected side effects, including fatigue, urinary urgency/frequency, and some bowel disturbance.   8/23/2023: PTE Notes  symptoms at baseline. Stable post void dribbling. Not doing Kegels.  Has had pelvic floor rehab post prostatectomy which was helpful. Denies dysuria, hematuria or bowel symptoms.  IPSS/QOL/EPIC Score 6/3/10.   1/2/24: Pt. presents for follow up visit.  Patient with complaints of nocturia 2-3 times a night and occasional urgency. Otherwise, he is doing well and does not have any other complaints.  IPSS/QOL/EPIC 7/2/11  PSA trend:  Pre salvage PSA 0.46 2/10/23 -> RT complete 7/11/23 0.03 12/15/23 0.03 4/17/24  Follow-up 4/23/24: Patient presents for progress check and evaluation Now s/p adjuvant radiation Prostate Bed 6,250 cGy over 25 Fxs from 6/06/2023 to 7/11/2023. Doing well overall. Continues with nocturia ~ 3x/night noticing he leaks if he misses a trip to the bathroom. "It's annoying" Denies frequency he is able to go throughout the day w/o needing to urinate ~ twice/day more depending on his liquid intake. Denies hematuria/dysuria/constipation. IPSS/QOL/EPIC 6/3/11  8/22/2024- Mr. Beatty presents today for follow up. He was previously a patient of Dr. Paul Hanley.  PSA done 7/25/2024 was .04 IPSS 6/QOL 3/EPIC 9.  Continues to note some frequency and nocturia.

## 2024-08-26 NOTE — PHYSICAL EXAM
[General Appearance - Well Developed] : well developed [] : no respiratory distress [Exaggerated Use Of Accessory Muscles For Inspiration] : no accessory muscle use [Edema] : no peripheral edema present [Nail Clubbing] : no clubbing  or cyanosis of the fingernails [Range of Motion to Joints] : range of motion to joints [No Focal Deficits] : no focal deficits [Oriented To Time, Place, And Person] : oriented to person, place, and time [Affect] : the affect was normal [Not Anxious] : not anxious [Normal] : normal spine exam without palpable tenderness, no kyphosis or scoliosis

## 2024-08-26 NOTE — HISTORY OF PRESENT ILLNESS
[FreeTextEntry1] : Mr. Beatty was previously a patient of Dr. Paul Hanley whom I am seeing in follow up post-radiation therapy.   73-year-old male with prostate cancer (up to G7(3+4) disease on biopsy 2/8/22, iPSA 12.4), now s/p prostatectomy 3/21/22; pathology showed a 93g gland with [id number] G7(3+4) disease, 0/7 nodes involved; positive margin present right apical, left apical, and left posterior. PSA never undetectable, 0.04 on 4/29/22 and rising to 0.36 most recently 1/17/23. Decipher 0.32, low risk. PSMA 2/28/23 showed no distant disease. Salvage radiation therapy to the prostate bed was recommended.  Radiation Treatment Summary: Prostate Bed 6,250 cGy over 25 Fxs from 6/06/2023 to 7/11/2023 Clinical Course: Tolerated the treatment well and developed the expected side effects, including fatigue, urinary urgency/frequency, and some bowel disturbance.   8/23/2023: PTE Notes  symptoms at baseline. Stable post void dribbling. Not doing Kegels.  Has had pelvic floor rehab post prostatectomy which was helpful. Denies dysuria, hematuria or bowel symptoms.  IPSS/QOL/EPIC Score 6/3/10.   1/2/24: Pt. presents for follow up visit.  Patient with complaints of nocturia 2-3 times a night and occasional urgency. Otherwise, he is doing well and does not have any other complaints.  IPSS/QOL/EPIC 7/2/11  PSA trend:  Pre salvage PSA 0.46 2/10/23 -> RT complete 7/11/23 0.03 12/15/23 0.03 4/17/24  Follow-up 4/23/24: Patient presents for progress check and evaluation Now s/p adjuvant radiation Prostate Bed 6,250 cGy over 25 Fxs from 6/06/2023 to 7/11/2023. Doing well overall. Continues with nocturia ~ 3x/night noticing he leaks if he misses a trip to the bathroom. "It's annoying" Denies frequency he is able to go throughout the day w/o needing to urinate ~ twice/day more depending on his liquid intake. Denies hematuria/dysuria/constipation. IPSS/QOL/EPIC 6/3/11  8/22/2024- Mr. Beatty presents today for follow up. He was previously a patient of Dr. Paul Hanley.  PSA done 7/25/2024 was .04 IPSS 6/QOL 3/EPIC 9.  Continues to note some frequency and nocturia.

## 2024-10-22 ENCOUNTER — APPOINTMENT (OUTPATIENT)
Dept: FAMILY MEDICINE | Facility: CLINIC | Age: 74
End: 2024-10-22
Payer: COMMERCIAL

## 2024-10-22 VITALS
HEIGHT: 68 IN | WEIGHT: 152 LBS | HEART RATE: 67 BPM | OXYGEN SATURATION: 98 % | TEMPERATURE: 97.3 F | BODY MASS INDEX: 23.04 KG/M2 | DIASTOLIC BLOOD PRESSURE: 69 MMHG | RESPIRATION RATE: 16 BRPM | SYSTOLIC BLOOD PRESSURE: 122 MMHG

## 2024-10-22 DIAGNOSIS — C61 MALIGNANT NEOPLASM OF PROSTATE: ICD-10-CM

## 2024-10-22 DIAGNOSIS — Z00.00 ENCOUNTER FOR GENERAL ADULT MEDICAL EXAMINATION W/OUT ABNORMAL FINDINGS: ICD-10-CM

## 2024-10-22 DIAGNOSIS — K21.9 GASTRO-ESOPHAGEAL REFLUX DISEASE W/OUT ESOPHAGITIS: ICD-10-CM

## 2024-10-22 PROCEDURE — 36415 COLL VENOUS BLD VENIPUNCTURE: CPT

## 2024-10-22 PROCEDURE — 99397 PER PM REEVAL EST PAT 65+ YR: CPT

## 2024-10-23 LAB
25(OH)D3 SERPL-MCNC: 33.7 NG/ML
ALBUMIN SERPL ELPH-MCNC: 4.3 G/DL
ALP BLD-CCNC: 88 U/L
ALT SERPL-CCNC: 22 U/L
ANION GAP SERPL CALC-SCNC: 17 MMOL/L
APPEARANCE: CLEAR
AST SERPL-CCNC: 28 U/L
BACTERIA: NEGATIVE /HPF
BASOPHILS # BLD AUTO: 0.06 K/UL
BASOPHILS NFR BLD AUTO: 1.4 %
BILIRUB SERPL-MCNC: 0.6 MG/DL
BILIRUBIN URINE: NEGATIVE
BLOOD URINE: NEGATIVE
BUN SERPL-MCNC: 23 MG/DL
CALCIUM SERPL-MCNC: 8.9 MG/DL
CAST: 0 /LPF
CHLORIDE SERPL-SCNC: 102 MMOL/L
CHOLEST SERPL-MCNC: 259 MG/DL
CO2 SERPL-SCNC: 24 MMOL/L
COLOR: NORMAL
CREAT SERPL-MCNC: 1.01 MG/DL
EGFR: 78 ML/MIN/1.73M2
EOSINOPHIL # BLD AUTO: 0.05 K/UL
EOSINOPHIL NFR BLD AUTO: 1.2 %
EPITHELIAL CELLS: 0 /HPF
ESTIMATED AVERAGE GLUCOSE: 105 MG/DL
GLUCOSE QUALITATIVE U: NEGATIVE MG/DL
GLUCOSE SERPL-MCNC: 75 MG/DL
HBA1C MFR BLD HPLC: 5.3 %
HCT VFR BLD CALC: 48.2 %
HDLC SERPL-MCNC: 94 MG/DL
HGB BLD-MCNC: 15.8 G/DL
IMM GRANULOCYTES NFR BLD AUTO: 0.2 %
KETONES URINE: ABNORMAL MG/DL
LDLC SERPL CALC-MCNC: 152 MG/DL
LEUKOCYTE ESTERASE URINE: ABNORMAL
LYMPHOCYTES # BLD AUTO: 0.97 K/UL
LYMPHOCYTES NFR BLD AUTO: 23 %
MAN DIFF?: NORMAL
MCHC RBC-ENTMCNC: 32.4 PG
MCHC RBC-ENTMCNC: 32.8 GM/DL
MCV RBC AUTO: 98.8 FL
MICROSCOPIC-UA: NORMAL
MONOCYTES # BLD AUTO: 0.57 K/UL
MONOCYTES NFR BLD AUTO: 13.5 %
NEUTROPHILS # BLD AUTO: 2.56 K/UL
NEUTROPHILS NFR BLD AUTO: 60.7 %
NITRITE URINE: NEGATIVE
NONHDLC SERPL-MCNC: 165 MG/DL
PH URINE: 6
PLATELET # BLD AUTO: 234 K/UL
POTASSIUM SERPL-SCNC: 4 MMOL/L
PROT SERPL-MCNC: 7 G/DL
PROTEIN URINE: 30 MG/DL
PSA SERPL-MCNC: 0.05 NG/ML
RBC # BLD: 4.88 M/UL
RBC # FLD: 13 %
RED BLOOD CELLS URINE: 4 /HPF
SODIUM SERPL-SCNC: 143 MMOL/L
SPECIFIC GRAVITY URINE: 1.02
TRIGL SERPL-MCNC: 80 MG/DL
TSH SERPL-ACNC: 2.52 UIU/ML
UROBILINOGEN URINE: 0.2 MG/DL
WBC # FLD AUTO: 4.22 K/UL
WHITE BLOOD CELLS URINE: 0 /HPF

## 2024-11-11 PROBLEM — C61 PROSTATE CANCER: Noted: 2022-01-19

## 2024-11-12 ENCOUNTER — NON-APPOINTMENT (OUTPATIENT)
Age: 74
End: 2024-11-12

## 2024-11-12 ENCOUNTER — APPOINTMENT (OUTPATIENT)
Dept: UROLOGY | Facility: CLINIC | Age: 74
End: 2024-11-12
Payer: COMMERCIAL

## 2024-11-12 DIAGNOSIS — Z92.3 PERSONAL HISTORY OF IRRADIATION: ICD-10-CM

## 2024-11-12 DIAGNOSIS — C61 MALIGNANT NEOPLASM OF PROSTATE: ICD-10-CM

## 2024-11-12 DIAGNOSIS — N39.3 STRESS INCONTINENCE (FEMALE) (MALE): ICD-10-CM

## 2024-11-12 DIAGNOSIS — N52.31 ERECTILE DYSFUNCTION FOLLOWING RADICAL PROSTATECTOMY: ICD-10-CM

## 2024-11-12 PROCEDURE — 99214 OFFICE O/P EST MOD 30 MIN: CPT

## 2024-11-12 PROCEDURE — G2211 COMPLEX E/M VISIT ADD ON: CPT | Mod: NC

## 2025-03-04 ENCOUNTER — APPOINTMENT (OUTPATIENT)
Dept: RADIATION ONCOLOGY | Facility: CLINIC | Age: 75
End: 2025-03-04
Payer: COMMERCIAL

## 2025-03-04 DIAGNOSIS — C61 MALIGNANT NEOPLASM OF PROSTATE: ICD-10-CM

## 2025-03-04 PROCEDURE — 99213 OFFICE O/P EST LOW 20 MIN: CPT | Mod: 93

## 2025-03-21 LAB
APPEARANCE: CLEAR
BACTERIA: NEGATIVE /HPF
BILIRUBIN URINE: NEGATIVE
BLOOD URINE: NEGATIVE
CAST: 0 /LPF
COLOR: YELLOW
EPITHELIAL CELLS: 0 /HPF
GLUCOSE QUALITATIVE U: NEGATIVE MG/DL
KETONES URINE: NEGATIVE MG/DL
LEUKOCYTE ESTERASE URINE: NEGATIVE
MICROSCOPIC-UA: NORMAL
NITRITE URINE: NEGATIVE
PH URINE: 6
PROTEIN URINE: NEGATIVE MG/DL
RED BLOOD CELLS URINE: 0 /HPF
SPECIFIC GRAVITY URINE: 1.02
UROBILINOGEN URINE: 0.2 MG/DL
WHITE BLOOD CELLS URINE: 0 /HPF

## 2025-03-22 LAB — BACTERIA UR CULT: NORMAL

## 2025-04-30 DIAGNOSIS — R31.0 GROSS HEMATURIA: ICD-10-CM

## (undated) DEVICE — XI TIP COVER

## (undated) DEVICE — XI ARM CLIP APPLIER MEDIUM-LARGE

## (undated) DEVICE — FOLEY CATH 2-WAY 20FR 5CC SILASTIC

## (undated) DEVICE — DRSG TEGADERM 2.5X3"

## (undated) DEVICE — XI ARM NEEDLE DRIVER SUTURECUT LRG 8MM

## (undated) DEVICE — GLV 8 PROTEXIS (CREAM) MICRO

## (undated) DEVICE — GOWN XXXL

## (undated) DEVICE — SUT VICRYL 1 36" CT-1 UNDYED

## (undated) DEVICE — SOL IRR BAG H2O 3000ML

## (undated) DEVICE — TROCAR SURGIQUEST AIRSEAL 12MMX100MM

## (undated) DEVICE — XI ARM FORCEP MARYLAND BIPOLAR

## (undated) DEVICE — SOL IRR BAG NS 0.9% 3000ML

## (undated) DEVICE — XI SYNCHROSEAL VESSEL SEALER 8MM

## (undated) DEVICE — DRAIN RESERVOIR FOR JACKSON PRATT 100CC CARDINAL

## (undated) DEVICE — GLV 7.5 PROTEXIS (CREAM) MICRO

## (undated) DEVICE — PACK ROBOTIC LIJ

## (undated) DEVICE — XI ARM FORCEP PROGRASP 8MM

## (undated) DEVICE — SUT VICRYL 2-0 36" CT-1 UNDYED

## (undated) DEVICE — POSITIONER PURPLE ARM ONE STEP (LARGE)

## (undated) DEVICE — VENODYNE/SCD SLEEVE CALF MEDIUM

## (undated) DEVICE — FOLEY CATH 2-WAY 16FR 5CC SILICONE

## (undated) DEVICE — TUBING STRYKEFLOW II SUCTION / IRRIGATOR

## (undated) DEVICE — PREP BETADINE SPONGE STICKS

## (undated) DEVICE — WARMING BLANKET UPPER ADULT

## (undated) DEVICE — XI NEEDLE DRIVER LARGE

## (undated) DEVICE — DRSG MASTISOL

## (undated) DEVICE — SUT VLOC 90 3-0 6" CV-23 VIOLET

## (undated) DEVICE — SUT POLYSORB 1 60" TIES

## (undated) DEVICE — SUT VICRYL 2-0 27" SH UNDYED

## (undated) DEVICE — XI OBTURATOR OPTICAL BLADELESS 8MM

## (undated) DEVICE — LIJ/LIA-CAMERA VIDEO C MOUNT DIGITAL 3 CHIP CAM: Type: DURABLE MEDICAL EQUIPMENT

## (undated) DEVICE — SUT POLYSORB 0 60" TIES UNDYED

## (undated) DEVICE — Device

## (undated) DEVICE — SYR CATH TIP 2 OZ

## (undated) DEVICE — XI ARM SCISSOR MONO CURVED

## (undated) DEVICE — XI ARM FORCEP TENACULUM

## (undated) DEVICE — GOWN XL

## (undated) DEVICE — D HELP - CLEARVIEW CLEARIFY SYSTEM

## (undated) DEVICE — ENDOCATCH 10MM SPECIMEN POUCH

## (undated) DEVICE — TUBING AIRSEAL TRI-LUMEN FILTERED

## (undated) DEVICE — ELCTR GROUNDING PAD ADULT COVIDIEN

## (undated) DEVICE — SUT VLOC 90 3-0 6" CV-23 UNDYED

## (undated) DEVICE — FOLEY HOLDER STATLOCK 2 WAY ADULT

## (undated) DEVICE — POSITIONER FOAM HEAD CRADLE (PINK)

## (undated) DEVICE — XI DRAPE COLUMN

## (undated) DEVICE — XI ARM CLIP APPLIER LARGE

## (undated) DEVICE — INSUFFLATION NDL COVIDIEN SURGINEEDLE VERESS 120MM

## (undated) DEVICE — BAG URINE W METER 2L

## (undated) DEVICE — POSITIONER PINK PAD PIGAZZI SYSTEM

## (undated) DEVICE — XI DRAPE ARM

## (undated) DEVICE — LUBRICATING JELLY ONESHOT 1.25OZ

## (undated) DEVICE — SUT MONOCRYL 4-0 27" PS-2 UNDYED

## (undated) DEVICE — XI SEAL UNIV 5- 8 MM